# Patient Record
Sex: FEMALE | Race: WHITE
[De-identification: names, ages, dates, MRNs, and addresses within clinical notes are randomized per-mention and may not be internally consistent; named-entity substitution may affect disease eponyms.]

---

## 2017-06-01 ENCOUNTER — HOSPITAL ENCOUNTER (EMERGENCY)
Dept: HOSPITAL 79 - ER1 | Age: 52
Discharge: HOME | End: 2017-06-01
Payer: COMMERCIAL

## 2017-06-01 DIAGNOSIS — F17.210: ICD-10-CM

## 2017-06-01 DIAGNOSIS — J44.1: Primary | ICD-10-CM

## 2017-06-01 DIAGNOSIS — Z88.0: ICD-10-CM

## 2017-06-01 DIAGNOSIS — Z91.041: ICD-10-CM

## 2017-06-01 LAB
BUN/CREATININE RATIO: 30 (ref 0–10)
HGB BLD-MCNC: 14.2 GM/DL (ref 12.3–15.3)
RED BLOOD COUNT: 4.41 M/UL (ref 4–5.1)
WHITE BLOOD COUNT: 8.1 K/UL (ref 4.5–11)

## 2017-10-03 ENCOUNTER — OFFICE VISIT (OUTPATIENT)
Dept: FAMILY MEDICINE CLINIC | Facility: CLINIC | Age: 52
End: 2017-10-03

## 2017-10-03 VITALS
DIASTOLIC BLOOD PRESSURE: 80 MMHG | BODY MASS INDEX: 44.65 KG/M2 | TEMPERATURE: 98.8 F | HEART RATE: 107 BPM | WEIGHT: 268 LBS | SYSTOLIC BLOOD PRESSURE: 140 MMHG | HEIGHT: 65 IN | OXYGEN SATURATION: 93 %

## 2017-10-03 DIAGNOSIS — E11.9 CONTROLLED TYPE 2 DIABETES MELLITUS WITHOUT COMPLICATION, WITHOUT LONG-TERM CURRENT USE OF INSULIN (HCC): ICD-10-CM

## 2017-10-03 DIAGNOSIS — L02.32 BOIL OF BUTTOCK: ICD-10-CM

## 2017-10-03 DIAGNOSIS — M15.9 PRIMARY OSTEOARTHRITIS INVOLVING MULTIPLE JOINTS: ICD-10-CM

## 2017-10-03 DIAGNOSIS — I10 ESSENTIAL HYPERTENSION: Primary | ICD-10-CM

## 2017-10-03 DIAGNOSIS — E78.2 MIXED HYPERLIPIDEMIA: ICD-10-CM

## 2017-10-03 DIAGNOSIS — E03.9 ACQUIRED HYPOTHYROIDISM: ICD-10-CM

## 2017-10-03 DIAGNOSIS — E53.8 VITAMIN B 12 DEFICIENCY: ICD-10-CM

## 2017-10-03 DIAGNOSIS — E55.9 VITAMIN D DEFICIENCY DISEASE: ICD-10-CM

## 2017-10-03 DIAGNOSIS — J06.9 ACUTE URI: ICD-10-CM

## 2017-10-03 DIAGNOSIS — Z72.0 TOBACCO ABUSE: ICD-10-CM

## 2017-10-03 PROBLEM — M15.0 PRIMARY OSTEOARTHRITIS INVOLVING MULTIPLE JOINTS: Status: ACTIVE | Noted: 2017-10-03

## 2017-10-03 PROCEDURE — 99406 BEHAV CHNG SMOKING 3-10 MIN: CPT | Performed by: NURSE PRACTITIONER

## 2017-10-03 PROCEDURE — 99214 OFFICE O/P EST MOD 30 MIN: CPT | Performed by: NURSE PRACTITIONER

## 2017-10-03 RX ORDER — IBUPROFEN 800 MG/1
TABLET ORAL
COMMUNITY
Start: 2017-10-02 | End: 2017-11-07

## 2017-10-03 RX ORDER — CYCLOBENZAPRINE HCL 10 MG
10 TABLET ORAL 2 TIMES DAILY PRN
Qty: 60 TABLET | Refills: 3 | Status: SHIPPED | OUTPATIENT
Start: 2017-10-03 | End: 2018-03-06 | Stop reason: SDUPTHER

## 2017-10-03 RX ORDER — HYDROCHLOROTHIAZIDE 25 MG/1
TABLET ORAL
Refills: 0 | COMMUNITY
Start: 2017-09-14 | End: 2017-11-07

## 2017-10-03 RX ORDER — AZITHROMYCIN 250 MG/1
TABLET, FILM COATED ORAL
Qty: 6 TABLET | Refills: 0 | Status: SHIPPED | OUTPATIENT
Start: 2017-10-03 | End: 2017-11-07

## 2017-10-03 RX ORDER — LEVOTHYROXINE SODIUM 88 UG/1
88 TABLET ORAL DAILY
COMMUNITY
End: 2017-10-04 | Stop reason: DRUGHIGH

## 2017-10-03 RX ORDER — CYCLOBENZAPRINE HCL 10 MG
TABLET ORAL
COMMUNITY
Start: 2017-10-02 | End: 2017-10-03 | Stop reason: SDUPTHER

## 2017-10-03 RX ORDER — PROMETHAZINE HYDROCHLORIDE 25 MG/1
TABLET ORAL
Refills: 0 | COMMUNITY
Start: 2017-08-22 | End: 2017-11-07

## 2017-10-03 RX ORDER — LISINOPRIL 10 MG/1
TABLET ORAL
Refills: 2 | COMMUNITY
Start: 2017-08-31 | End: 2017-10-03 | Stop reason: SINTOL

## 2017-10-03 NOTE — ASSESSMENT & PLAN NOTE
Tobacco use counseling was provided for a total of 6 minutes today.  Patient has failed Chantix.  She declines smoke cessation at this time.  We have reviewed the side effects of smoking on her disease processes.

## 2017-10-03 NOTE — PROGRESS NOTES
Subjective   Corinne CEDENO is a 52 y.o. female.     Chief Complaint   Patient presents with   • Hyperlipidemia   • Hypothyroidism   • Osteoarthritis   • Hypertension   • Diabetes       History of Present Illness     Patient is here today to establish care with Mimi HALL.  Patient has been under the care of [facility].  She reports that she has transferred care because she was unsure why she had to be seen every 14 days for refills on ibuprofen and Flexeril.    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  She has adult children.     Hypothyroidism-currently taking Synthroid 88 µg daily.  Patient reports that her most recent lab values showed that her thyroid function was not controlled.  She does remain fatigued and has difficulty losing weight.    Hypertension-patient is currently taking lisinopril 10 mg daily along with HCTZ 25 mg as needed.  She reports that the lisinopril has created a dry nagging cough.  This started a few weeks after she started lisinopril and has lasted now for several months.  Patient reports that she shared this with her primary care provider but medication was not changed.  She does check her blood pressure occasionally when in [facility] or other public places with blood pressure monitor.     Hyperlipidemia-patient reports that her most recent lab values showed a very high triglyceride level.  She states that she tried to tell her primary care provider that she had ate half a honey bun and drank some soda just prior to having these labs drawn which may have affected the value.  She was told by her provider that it didn't matter and started on cholesterol medication which she is not taking.  Patient is scheduled to see cardiology due to uncontrolled hyperlipidemia.  Has appointment with cardiology next week scheduled with Dr. Segovia October 10 , 2017.       Osteoarthritis-bilateral kneeosteoarthritis.  She has had rooster, injections in  the past.  She remains under the care of orthopedic specialty group for osteoarthritis.  In the past she did take pain medication.  She is now taking ibuprofen 800 mg every 8 hours as needed.  She states that this really does not help her pain much.  She has felt Layne in the past.  Recent weight gain is placing increased stress on her knees.    Diabetes  Current symptoms include hyperglycemia at random times depending on diet.. Patient denies paresthesia of the feet, visual disturbances, polydipsia, polyuria, hypoglycemia and foot ulcerations. Evaluation to date has been: fasting blood sugar and hemoglobin A1C. Home sugars: patient does not check sugars. Current treatments: metformin. Last dilated eye exam with in past two years. Most recent hemoglobin A1c   Lab Results   Component Value Date    HGBA1C 6.0 (H) 11/13/2014      Boil on buttock -patient reports having a history of recurrent boils in her groin, breast and buttock area.  She does not wish to have this examined today.    Smoker-patient smokes approximately 1/2-1 pack of cigarettes a day.  She states she has taken Chantix in the past.  She has not tried patches.  She does not wish to quit smoking at this time.    Vitamin B12 deficiency-patient was previously on vitamin B12 injections which did help with energy.    Vitamin D deficiency-patient is not currently taking a supplement.  She does not receive at least 30 minutes of sun exposure daily.    Low back pain with muscle spasm-patient reports that Flexeril 10 mg 1 by mouth twice a day when necessary is helpful to control low back pain and spasm.  She denies any substance abuse or addiction.  She denies any side effects.    Acute URI symptoms - symptoms have been present for over a week.  Thick postnasal drainage.  Sinus tenderness and ear fullness.      The following portions of the patient's history were reviewed and updated as appropriate: allergies, current medications, past family history, past  "medical history, past social history, past surgical history and problem list.    Review of Systems   Constitutional: Positive for fatigue and unexpected weight change. Negative for activity change and appetite change.   HENT: Positive for congestion, ear pain and sinus pressure.    Eyes: Negative.    Respiratory: Positive for cough. Negative for shortness of breath and wheezing.    Cardiovascular: Positive for leg swelling (At times her legs do swell when she eats in decreased sodium.  She takes HCTZ as needed for edema. Needs less often than daily). Negative for chest pain and palpitations.   Gastrointestinal: Positive for constipation and nausea. Negative for abdominal pain, blood in stool, diarrhea and vomiting.        Frequent constipation.  Nausea at times   Endocrine: Negative.    Genitourinary: Negative for dysuria.        History of vaginal mesh   Musculoskeletal: Positive for arthralgias, back pain, gait problem, joint swelling and myalgias. Negative for neck stiffness.   Skin: Positive for rash.   Allergic/Immunologic: Positive for environmental allergies.   Hematological: Negative.    Psychiatric/Behavioral: Negative for agitation, behavioral problems, dysphoric mood, self-injury and suicidal ideas. The patient is not nervous/anxious.    All other systems reviewed and are negative.      Objective     /80 (BP Location: Right arm, Patient Position: Sitting, Cuff Size: Adult)  Pulse 107  Temp 98.8 °F (37.1 °C) (Tympanic)   Ht 65\" (165.1 cm)  Wt 268 lb (122 kg)  SpO2 93%  BMI 44.6 kg/m2      Physical Exam   Constitutional: She is oriented to person, place, and time. Vital signs are normal. She appears well-developed and well-nourished. She has a sickly appearance. No distress.   Appears acutely ill.    HENT:   Head: Normocephalic and atraumatic.   Right Ear: Hearing and external ear normal. No lacerations. No drainage or swelling. No foreign bodies. No mastoid tenderness. A middle ear effusion is " present.   Left Ear: Hearing and external ear normal. No lacerations. No drainage or swelling. No foreign bodies. No mastoid tenderness. A middle ear effusion is present.   Nose: No nose lacerations, sinus tenderness or nasal deformity. No epistaxis.  No foreign bodies. Right sinus exhibits frontal sinus tenderness. Left sinus exhibits frontal sinus tenderness.   Mouth/Throat: Uvula is midline. Oropharyngeal exudate present. No tonsillar abscesses.   TM's are cloudy bilateral   Eyes: EOM are normal. Pupils are equal, round, and reactive to light.   Neck: Normal range of motion. Neck supple. No tracheal deviation present. No thyromegaly present.   Cardiovascular: Normal rate, regular rhythm, normal heart sounds and intact distal pulses.    No murmur heard.  Pulmonary/Chest: Effort normal and breath sounds normal. No respiratory distress. She has no wheezes. She has no rales. She exhibits no tenderness.   Cough noted    Abdominal: Soft. Bowel sounds are normal. She exhibits no distension and no mass. There is no tenderness. There is no rebound and no guarding.   Musculoskeletal:        Right knee: Tenderness found. Patellar tendon tenderness noted.        Left knee: Tenderness found. Patellar tendon tenderness noted.        Lumbar back: She exhibits decreased range of motion, tenderness, pain and spasm.   Decreased lumbar curvature, there is pain with forward flexion. DTR's +2. No edema.    Lymphadenopathy:     She has cervical adenopathy.        Right cervical: Superficial cervical adenopathy present. No deep cervical adenopathy present.       Left cervical: Superficial cervical adenopathy present. No deep cervical adenopathy present.   Neurological: She is alert and oriented to person, place, and time. She has normal reflexes.   Skin: Skin is warm and dry. Lesion noted. She is not diaphoretic. No erythema. No pallor.   Declines examination of buttock boil    Psychiatric: She has a normal mood and affect. Her  behavior is normal. Judgment and thought content normal. Her affect is not inappropriate. Cognition and memory are normal.   Denies thoughts of hurting self or others.   Nursing note and vitals reviewed.      Assessment/Plan     Problem List Items Addressed This Visit        Cardiovascular and Mediastinum    Essential hypertension - Primary    Relevant Medications    hydrochlorothiazide (HYDRODIURIL) 25 MG tablet    metoprolol tartrate (LOPRESSOR) 25 MG tablet    Other Relevant Orders    CBC & Differential    Comprehensive Metabolic Panel    MicroAlbumin, Urine, Random - Urine, Clean Catch    Mixed hyperlipidemia    Relevant Orders    Lipid Panel       Respiratory    Acute URI    Relevant Medications    azithromycin (ZITHROMAX Z-TERRANCE) 250 MG tablet       Digestive    Vitamin D deficiency disease    Relevant Orders    Vitamin D 25 Hydroxy    Vitamin B 12 deficiency    Relevant Orders    Vitamin B12       Endocrine    Controlled type 2 diabetes mellitus without complication    Relevant Orders    Hemoglobin A1c    Acquired hypothyroidism    Relevant Medications    levothyroxine (SYNTHROID, LEVOTHROID) 88 MCG tablet    metoprolol tartrate (LOPRESSOR) 25 MG tablet    Other Relevant Orders    TSH    T3, Free    T4, Free    Thyroid Antibodies       Musculoskeletal and Integument    Primary osteoarthritis involving multiple joints    Overview     Both knees, worse in left knee , history of rooster comb injections          Boil of buttock    Relevant Medications    azithromycin (ZITHROMAX Z-TERRANCE) 250 MG tablet    mupirocin (BACTROBAN) 2 % ointment       Other    Tobacco abuse    Current Assessment & Plan     Tobacco use counseling was provided for a total of 6 minutes today.  Patient has failed Chantix.  She declines smoke cessation at this time.  We have reviewed the side effects of smoking on her disease processes.           Recommend patient perform home glucose monitoring twice daily and bring log to next visit.  Pt has  been educated/instructed on diabetic care and protocols. Sick day rules reviewed. Continue to monitor blood sugar and report abnormal readings as discussed today. Pt / family state understanding.   Weight loss and exercise as tolerated is recommended.  I have discussed diagnosis in detail today allowing time for questions and answers. Pt is aware of reasons to seek urgent or emergent medical care as well as reasons to return to the clinic for evaluation. Possible side effects, interactions and progression of symptoms discussed as well. Pt / family states understanding.   Stop lisinopril.  Start metoprolol 25 mg by mouth twice a day.  Reviewed need to monitor her heart rate and hold it metoprolol if less than 60.  Patient states understanding.  Schedule fasting labs in the morning.  Request old records.  Recommend flu vaccine when available.  Follow-up in 2-4 weeks, sooner if needed.           This document has been electronically signed by:  RAFAEL Mazariegos, NP-C

## 2017-10-04 ENCOUNTER — LAB (OUTPATIENT)
Dept: FAMILY MEDICINE CLINIC | Facility: CLINIC | Age: 52
End: 2017-10-04

## 2017-10-04 DIAGNOSIS — E78.2 MIXED HYPERLIPIDEMIA: ICD-10-CM

## 2017-10-04 DIAGNOSIS — E53.8 VITAMIN B 12 DEFICIENCY: ICD-10-CM

## 2017-10-04 DIAGNOSIS — E03.9 ACQUIRED HYPOTHYROIDISM: ICD-10-CM

## 2017-10-04 DIAGNOSIS — I10 ESSENTIAL HYPERTENSION: ICD-10-CM

## 2017-10-04 DIAGNOSIS — E11.9 CONTROLLED TYPE 2 DIABETES MELLITUS WITHOUT COMPLICATION, WITHOUT LONG-TERM CURRENT USE OF INSULIN (HCC): ICD-10-CM

## 2017-10-04 DIAGNOSIS — E03.9 ACQUIRED HYPOTHYROIDISM: Primary | ICD-10-CM

## 2017-10-04 DIAGNOSIS — E55.9 VITAMIN D DEFICIENCY DISEASE: ICD-10-CM

## 2017-10-04 LAB
25(OH)D3 SERPL-MCNC: 17 NG/ML
ALBUMIN SERPL-MCNC: 4.2 G/DL (ref 3.5–5)
ALBUMIN/GLOB SERPL: 1.4 G/DL (ref 1.5–2.5)
ALP SERPL-CCNC: 73 U/L (ref 35–104)
ALT SERPL W P-5'-P-CCNC: 19 U/L (ref 10–36)
ANION GAP SERPL CALCULATED.3IONS-SCNC: 5 MMOL/L (ref 3.6–11.2)
AST SERPL-CCNC: 18 U/L (ref 10–30)
BASOPHILS # BLD AUTO: 0.06 10*3/MM3 (ref 0–0.3)
BASOPHILS NFR BLD AUTO: 0.7 % (ref 0–2)
BILIRUB SERPL-MCNC: 0.3 MG/DL (ref 0.2–1.8)
BUN BLD-MCNC: 16 MG/DL (ref 7–21)
BUN/CREAT SERPL: 26.2 (ref 7–25)
CALCIUM SPEC-SCNC: 9.2 MG/DL (ref 7.7–10)
CHLORIDE SERPL-SCNC: 110 MMOL/L (ref 99–112)
CHOLEST SERPL-MCNC: 285 MG/DL (ref 0–200)
CO2 SERPL-SCNC: 25 MMOL/L (ref 24.3–31.9)
CREAT BLD-MCNC: 0.61 MG/DL (ref 0.43–1.29)
DEPRECATED RDW RBC AUTO: 46.1 FL (ref 37–54)
EOSINOPHIL # BLD AUTO: 0.41 10*3/MM3 (ref 0–0.7)
EOSINOPHIL NFR BLD AUTO: 4.8 % (ref 0–5)
ERYTHROCYTE [DISTWIDTH] IN BLOOD BY AUTOMATED COUNT: 13.2 % (ref 11.5–14.5)
GFR SERPL CREATININE-BSD FRML MDRD: 104 ML/MIN/1.73
GLOBULIN UR ELPH-MCNC: 2.9 GM/DL
GLUCOSE BLD-MCNC: 114 MG/DL (ref 70–110)
HBA1C MFR BLD: 6.2 % (ref 4.5–5.7)
HCT VFR BLD AUTO: 42.6 % (ref 37–47)
HDLC SERPL-MCNC: 50 MG/DL (ref 60–100)
HGB BLD-MCNC: 13.8 G/DL (ref 12–16)
IMM GRANULOCYTES # BLD: 0.02 10*3/MM3 (ref 0–0.03)
IMM GRANULOCYTES NFR BLD: 0.2 % (ref 0–0.5)
LDLC SERPL CALC-MCNC: 163 MG/DL (ref 0–100)
LDLC/HDLC SERPL: 3.26 {RATIO}
LYMPHOCYTES # BLD AUTO: 3.07 10*3/MM3 (ref 1–3)
LYMPHOCYTES NFR BLD AUTO: 35.6 % (ref 21–51)
MCH RBC QN AUTO: 31.7 PG (ref 27–33)
MCHC RBC AUTO-ENTMCNC: 32.4 G/DL (ref 33–37)
MCV RBC AUTO: 97.9 FL (ref 80–94)
MONOCYTES # BLD AUTO: 0.75 10*3/MM3 (ref 0.1–0.9)
MONOCYTES NFR BLD AUTO: 8.7 % (ref 0–10)
NEUTROPHILS # BLD AUTO: 4.32 10*3/MM3 (ref 1.4–6.5)
NEUTROPHILS NFR BLD AUTO: 50 % (ref 30–70)
OSMOLALITY SERPL CALC.SUM OF ELEC: 281.4 MOSM/KG (ref 273–305)
PLATELET # BLD AUTO: 335 10*3/MM3 (ref 130–400)
PMV BLD AUTO: 11.6 FL (ref 6–10)
POTASSIUM BLD-SCNC: 4.3 MMOL/L (ref 3.5–5.3)
PROT SERPL-MCNC: 7.1 G/DL (ref 6–8)
RBC # BLD AUTO: 4.35 10*6/MM3 (ref 4.2–5.4)
SODIUM BLD-SCNC: 140 MMOL/L (ref 135–153)
T3FREE SERPL-MCNC: 2.4 PG/ML (ref 2.3–4.2)
T4 FREE SERPL-MCNC: 0.91 NG/DL (ref 0.89–1.76)
TRIGL SERPL-MCNC: 359 MG/DL (ref 0–150)
TSH SERPL DL<=0.05 MIU/L-ACNC: 17.12 MIU/ML (ref 0.55–4.78)
VIT B12 BLD-MCNC: 361 PG/ML (ref 211–911)
VLDLC SERPL-MCNC: 71.8 MG/DL
WBC NRBC COR # BLD: 8.63 10*3/MM3 (ref 4.5–12.5)

## 2017-10-04 PROCEDURE — 86800 THYROGLOBULIN ANTIBODY: CPT | Performed by: NURSE PRACTITIONER

## 2017-10-04 PROCEDURE — 84439 ASSAY OF FREE THYROXINE: CPT | Performed by: NURSE PRACTITIONER

## 2017-10-04 PROCEDURE — 82607 VITAMIN B-12: CPT | Performed by: NURSE PRACTITIONER

## 2017-10-04 PROCEDURE — 82306 VITAMIN D 25 HYDROXY: CPT | Performed by: NURSE PRACTITIONER

## 2017-10-04 PROCEDURE — 84481 FREE ASSAY (FT-3): CPT | Performed by: NURSE PRACTITIONER

## 2017-10-04 PROCEDURE — 86376 MICROSOMAL ANTIBODY EACH: CPT | Performed by: NURSE PRACTITIONER

## 2017-10-04 PROCEDURE — 80061 LIPID PANEL: CPT | Performed by: NURSE PRACTITIONER

## 2017-10-04 PROCEDURE — 80050 GENERAL HEALTH PANEL: CPT | Performed by: NURSE PRACTITIONER

## 2017-10-04 PROCEDURE — 83036 HEMOGLOBIN GLYCOSYLATED A1C: CPT | Performed by: NURSE PRACTITIONER

## 2017-10-04 RX ORDER — LEVOTHYROXINE SODIUM 112 UG/1
112 TABLET ORAL DAILY
Qty: 30 TABLET | Refills: 3 | Status: SHIPPED | OUTPATIENT
Start: 2017-10-04 | End: 2018-03-06 | Stop reason: SDUPTHER

## 2017-10-04 NOTE — PROGRESS NOTES
Called pt and let them know to  synthroid increased dosage at the pharmacy. Advised pt to stop taking the 88mcg. PKF

## 2017-10-05 LAB
THYROGLOB AB SERPL-ACNC: 29 IU/ML (ref 0–0.9)
THYROPEROXIDASE AB SERPL-ACNC: 178 IU/ML (ref 0–34)

## 2017-11-07 ENCOUNTER — OFFICE VISIT (OUTPATIENT)
Dept: FAMILY MEDICINE CLINIC | Facility: CLINIC | Age: 52
End: 2017-11-07

## 2017-11-07 VITALS
HEIGHT: 65 IN | SYSTOLIC BLOOD PRESSURE: 132 MMHG | OXYGEN SATURATION: 93 % | WEIGHT: 264 LBS | DIASTOLIC BLOOD PRESSURE: 80 MMHG | HEART RATE: 74 BPM | TEMPERATURE: 98.1 F | BODY MASS INDEX: 43.99 KG/M2

## 2017-11-07 DIAGNOSIS — Z72.0 TOBACCO ABUSE: ICD-10-CM

## 2017-11-07 DIAGNOSIS — E78.2 MIXED HYPERLIPIDEMIA: ICD-10-CM

## 2017-11-07 DIAGNOSIS — Z12.39 SCREENING FOR BREAST CANCER: ICD-10-CM

## 2017-11-07 DIAGNOSIS — Z23 ENCOUNTER FOR IMMUNIZATION: ICD-10-CM

## 2017-11-07 DIAGNOSIS — E53.8 VITAMIN B 12 DEFICIENCY: ICD-10-CM

## 2017-11-07 DIAGNOSIS — E03.9 ACQUIRED HYPOTHYROIDISM: ICD-10-CM

## 2017-11-07 DIAGNOSIS — I10 ESSENTIAL HYPERTENSION: ICD-10-CM

## 2017-11-07 DIAGNOSIS — E11.51 TYPE 2 DIABETES MELLITUS WITH DIABETIC PERIPHERAL ANGIOPATHY WITHOUT GANGRENE, WITHOUT LONG-TERM CURRENT USE OF INSULIN (HCC): Primary | ICD-10-CM

## 2017-11-07 DIAGNOSIS — J30.89 NON-SEASONAL ALLERGIC RHINITIS DUE TO OTHER ALLERGIC TRIGGER, UNSPECIFIED CHRONICITY: ICD-10-CM

## 2017-11-07 DIAGNOSIS — M15.9 PRIMARY OSTEOARTHRITIS INVOLVING MULTIPLE JOINTS: ICD-10-CM

## 2017-11-07 DIAGNOSIS — E55.9 VITAMIN D DEFICIENCY DISEASE: ICD-10-CM

## 2017-11-07 PROBLEM — E11.59 TYPE 2 DIABETES MELLITUS WITH CIRCULATORY DISORDER (HCC): Status: ACTIVE | Noted: 2017-10-03

## 2017-11-07 PROCEDURE — 99215 OFFICE O/P EST HI 40 MIN: CPT | Performed by: NURSE PRACTITIONER

## 2017-11-07 PROCEDURE — 90686 IIV4 VACC NO PRSV 0.5 ML IM: CPT | Performed by: NURSE PRACTITIONER

## 2017-11-07 PROCEDURE — 90471 IMMUNIZATION ADMIN: CPT | Performed by: NURSE PRACTITIONER

## 2017-11-07 RX ORDER — EZETIMIBE 10 MG/1
10 TABLET ORAL DAILY
Qty: 30 TABLET | Refills: 5 | Status: SHIPPED | OUTPATIENT
Start: 2017-11-07 | End: 2018-03-06 | Stop reason: SDUPTHER

## 2017-11-07 RX ORDER — CETIRIZINE HYDROCHLORIDE 10 MG/1
10 TABLET ORAL DAILY PRN
Qty: 30 TABLET | Refills: 5 | Status: SHIPPED | OUTPATIENT
Start: 2017-11-07 | End: 2018-04-02

## 2017-11-07 RX ORDER — FENOFIBRATE 67 MG/1
67 CAPSULE ORAL
Qty: 30 CAPSULE | Refills: 5 | Status: SHIPPED | OUTPATIENT
Start: 2017-11-07 | End: 2018-03-06 | Stop reason: SDUPTHER

## 2017-11-07 RX ORDER — CHOLECALCIFEROL (VITAMIN D3) 1250 MCG
50000 CAPSULE ORAL
Qty: 4 CAPSULE | Refills: 5 | Status: SHIPPED | OUTPATIENT
Start: 2017-11-07 | End: 2018-03-06 | Stop reason: SDUPTHER

## 2017-11-07 RX ORDER — LANCETS 30 GAUGE
1 EACH MISCELLANEOUS DAILY
Qty: 30 EACH | Refills: 5 | Status: SHIPPED | OUTPATIENT
Start: 2017-11-07 | End: 2018-03-06 | Stop reason: SDUPTHER

## 2017-11-07 RX ORDER — FLUTICASONE PROPIONATE 50 MCG
2 SPRAY, SUSPENSION (ML) NASAL DAILY
Qty: 1 BOTTLE | Refills: 5 | Status: SHIPPED | OUTPATIENT
Start: 2017-11-07 | End: 2018-03-06 | Stop reason: SDUPTHER

## 2017-11-07 NOTE — PROGRESS NOTES
Subjective   Corinne CEDENO is a 52 y.o. female.     Chief Complaint   Patient presents with   • Results   • Hypothyroidism       History of Present Illness     Patient is here to follow-up on multiple chronic processes.  She has lab results to review and discuss.      New diagnosis of type 2 diabetes-patient is unaware that she is a type II diabetic.  In the past she did take metformin for prediabetes but was unable to tolerate due to GI side effects.    Essential hypertension-stable on Lopressor.    Mixed hyperlipidemia-patient was unable to tolerate statins due to muscle pain and weakness.  She admits to a high fat unhealthy diet.  She is unable to exercise due to chronic knee pain.    Vitamin D deficiency-patient was unaware she had vitamin D deficiency.  She is not taking a supplement.  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    Vitamin B12 deficiency-patient is taking monthly B12 injection.  She reports having some increased energy with recent B12 injection.    Hypothyroidism-patient has adjusted her Synthroid as instructed after review of recent labs.  She has not been seen by endocrinology.  She has not had a thyroid ultrasound.    Osteoarthritis involving multiple joints-chronic with symptoms present greater than 5 years.  Patient has had a joint injection in her knees as well as rooster, injections in the past.  Rooster comb  injections are helpful for 3-6 months.  Patient is scheduled this month to repeat Rister come injections.    Tobacco abuse-patient smokes one pack or more a day.  She states that at this time she is not interested in smoke cessation medication.  She'll stop in the past with Chantix.  The following portions of the patient's history were reviewed and updated as appropriate: allergies, current medications, past family history, past medical history, past social history, past surgical history and problem list.    Review of Systems   Constitutional: Positive for fatigue.  "Negative for activity change, appetite change, chills and fever.   HENT: Positive for congestion and rhinorrhea. Negative for ear pain, facial swelling, hearing loss, sinus pressure, sore throat, trouble swallowing and voice change.    Eyes: Negative for pain, discharge and visual disturbance.   Respiratory: Negative.  Negative for apnea, cough, chest tightness, shortness of breath and wheezing.    Cardiovascular: Negative.  Negative for chest pain, palpitations and leg swelling.   Gastrointestinal: Negative.  Negative for abdominal pain, blood in stool, constipation, diarrhea, nausea and vomiting.   Endocrine: Negative.    Genitourinary: Negative for dysuria.   Musculoskeletal: Positive for arthralgias and gait problem. Negative for neck stiffness.   Skin: Negative for color change.   Allergic/Immunologic: Positive for environmental allergies.   Neurological: Negative for headaches.   Hematological: Negative.    Psychiatric/Behavioral: Negative for confusion, dysphoric mood and suicidal ideas. The patient is not nervous/anxious.    All other systems reviewed and are negative.      Objective     /80 (BP Location: Right arm, Patient Position: Sitting, Cuff Size: Adult)  Pulse 74  Temp 98.1 °F (36.7 °C) (Tympanic)   Ht 65\" (165.1 cm)  Wt 264 lb (120 kg)  SpO2 93%  BMI 43.93 kg/m2  Lab on 10/04/2017   Component Date Value Ref Range Status   • Glucose 10/04/2017 114* 70 - 110 mg/dL Final   • BUN 10/04/2017 16  7 - 21 mg/dL Final   • Creatinine 10/04/2017 0.61  0.43 - 1.29 mg/dL Final   • Sodium 10/04/2017 140  135 - 153 mmol/L Final   • Potassium 10/04/2017 4.3  3.5 - 5.3 mmol/L Final   • Chloride 10/04/2017 110  99 - 112 mmol/L Final   • CO2 10/04/2017 25.0  24.3 - 31.9 mmol/L Final   • Calcium 10/04/2017 9.2  7.7 - 10.0 mg/dL Final   • Total Protein 10/04/2017 7.1  6.0 - 8.0 g/dL Final   • Albumin 10/04/2017 4.20  3.50 - 5.00 g/dL Final   • ALT (SGPT) 10/04/2017 19  10 - 36 U/L Final   • AST (SGOT) " 10/04/2017 18  10 - 30 U/L Final   • Alkaline Phosphatase 10/04/2017 73  35 - 104 U/L Final   • Total Bilirubin 10/04/2017 0.3  0.2 - 1.8 mg/dL Final   • eGFR Non African Amer 10/04/2017 104  >60 mL/min/1.73 Final   • Globulin 10/04/2017 2.9  gm/dL Final   • A/G Ratio 10/04/2017 1.4* 1.5 - 2.5 g/dL Final   • BUN/Creatinine Ratio 10/04/2017 26.2* 7.0 - 25.0 Final   • Anion Gap 10/04/2017 5.0  3.6 - 11.2 mmol/L Final   • TSH 10/04/2017 17.123* 0.550 - 4.780 mIU/mL Final   • Hemoglobin A1C 10/04/2017 6.20* 4.50 - 5.70 % Final   • 25 Hydroxy, Vitamin D 10/04/2017 17.0  ng/ml Final   • Total Cholesterol 10/04/2017 285* 0 - 200 mg/dL Final   • Triglycerides 10/04/2017 359* 0 - 150 mg/dL Final   • HDL Cholesterol 10/04/2017 50* 60 - 100 mg/dL Final   • LDL Cholesterol  10/04/2017 163* 0 - 100 mg/dL Final   • VLDL Cholesterol 10/04/2017 71.8  mg/dL Final   • LDL/HDL Ratio 10/04/2017 3.26   Final   • Vitamin B-12 10/04/2017 361  211 - 911 pg/mL Final   • Free T4 10/04/2017 0.91  0.89 - 1.76 ng/dL Final   • Thyroid Peroxidase Antibody 10/04/2017 178* 0 - 34 IU/mL Final   • Thyroglobulin Ab 10/04/2017 29.0* 0.0 - 0.9 IU/mL Final   • WBC 10/04/2017 8.63  4.50 - 12.50 10*3/mm3 Final   • RBC 10/04/2017 4.35  4.20 - 5.40 10*6/mm3 Final   • Hemoglobin 10/04/2017 13.8  12.0 - 16.0 g/dL Final   • Hematocrit 10/04/2017 42.6  37.0 - 47.0 % Final   • MCV 10/04/2017 97.9* 80.0 - 94.0 fL Final   • MCH 10/04/2017 31.7  27.0 - 33.0 pg Final   • MCHC 10/04/2017 32.4* 33.0 - 37.0 g/dL Final   • RDW 10/04/2017 13.2  11.5 - 14.5 % Final   • RDW-SD 10/04/2017 46.1  37.0 - 54.0 fl Final   • MPV 10/04/2017 11.6* 6.0 - 10.0 fL Final   • Platelets 10/04/2017 335  130 - 400 10*3/mm3 Final   • Neutrophil % 10/04/2017 50.0  30.0 - 70.0 % Final   • Lymphocyte % 10/04/2017 35.6  21.0 - 51.0 % Final   • Monocyte % 10/04/2017 8.7  0.0 - 10.0 % Final   • Eosinophil % 10/04/2017 4.8  0.0 - 5.0 % Final   • Basophil % 10/04/2017 0.7  0.0 - 2.0 % Final   •  Immature Grans % 10/04/2017 0.2  0.0 - 0.5 % Final   • Neutrophils, Absolute 10/04/2017 4.32  1.40 - 6.50 10*3/mm3 Final   • Lymphocytes, Absolute 10/04/2017 3.07* 1.00 - 3.00 10*3/mm3 Final   • Monocytes, Absolute 10/04/2017 0.75  0.10 - 0.90 10*3/mm3 Final   • Eosinophils, Absolute 10/04/2017 0.41  0.00 - 0.70 10*3/mm3 Final   • Basophils, Absolute 10/04/2017 0.06  0.00 - 0.30 10*3/mm3 Final   • Immature Grans, Absolute 10/04/2017 0.02  0.00 - 0.03 10*3/mm3 Final   • Osmolality Calc 10/04/2017 281.4  273.0 - 305.0 mOsm/kg Final       Physical Exam   Constitutional: She is oriented to person, place, and time. Vital signs are normal. She appears well-developed and well-nourished. No distress.   Visibly obese.   Very pleasant adult female.   HENT:   Head: Normocephalic and atraumatic.   Right Ear: External ear normal.   Left Ear: External ear normal.   Nose: Mucosal edema present.   Mouth/Throat: Oropharynx is clear and moist.   Eyes: EOM are normal. Pupils are equal, round, and reactive to light.   Neck: Normal range of motion. Neck supple. No tracheal deviation present. No thyromegaly present.   Cardiovascular: Normal rate, regular rhythm, normal heart sounds and intact distal pulses.    No murmur heard.  Pulmonary/Chest: Effort normal and breath sounds normal. No respiratory distress. She has no wheezes. She has no rales. She exhibits no tenderness.   Abdominal: Soft. Bowel sounds are normal. She exhibits no distension and no mass. There is no tenderness. There is no rebound and no guarding.   Musculoskeletal:        Right knee: She exhibits decreased range of motion and effusion.        Left knee: She exhibits decreased range of motion and effusion.        Lumbar back: She exhibits decreased range of motion, tenderness, pain and spasm.   Decreased lumbar curvature, there is pain with forward flexion. DTR's +2. No edema.    Lymphadenopathy:     She has no cervical adenopathy.   Neurological: She is alert and  oriented to person, place, and time. She has normal reflexes.   Skin: Skin is warm and dry. No rash noted. She is not diaphoretic. No erythema. No pallor.   Psychiatric: She has a normal mood and affect. Her speech is normal and behavior is normal. Judgment and thought content normal. Her affect is not inappropriate. Cognition and memory are normal.   Denies thoughts of hurting self or others.   Nursing note and vitals reviewed.      Assessment/Plan     Problem List Items Addressed This Visit        Cardiovascular and Mediastinum    Essential hypertension    Type 2 diabetes mellitus with circulatory disorder - Primary    Relevant Medications    Blood Glucose Monitoring Suppl kit    DROPLET LANCETS ULTRA THIN 30G misc    glucose blood test strip    Other Relevant Orders    US Thyroid    CBC & Differential    Comprehensive Metabolic Panel    Lipid Panel    TSH    Hemoglobin A1c    Ambulatory Referral to Endocrinology (Completed)    Mixed hyperlipidemia    Relevant Medications    ezetimibe (ZETIA) 10 MG tablet    fenofibrate micronized (LOFIBRA) 67 MG capsule       Digestive    Vitamin D deficiency disease    Relevant Medications    Cholecalciferol (VITAMIN D3) 42056 units capsule    Other Relevant Orders    Vitamin D 25 Hydroxy    Vitamin B 12 deficiency       Endocrine    Acquired hypothyroidism    Relevant Orders    TSH    Ambulatory Referral to Endocrinology (Completed)       Musculoskeletal and Integument    Primary osteoarthritis involving multiple joints    Overview     Both knees, worse in left knee , history of rooster comb injections             Other    Tobacco abuse      Other Visit Diagnoses     Screening for breast cancer        Relevant Orders    Mammo Screening Digital Tomosynthesis Bilateral With CAD    Non-seasonal allergic rhinitis due to other allergic trigger, unspecified chronicity        Relevant Medications    cetirizine (zyrTEC) 10 MG tablet    fluticasone (FLONASE) 50 MCG/ACT nasal spray     Encounter for immunization        Relevant Orders    Flu Vaccine Quad PF 3YR+ (FLUARIX/FLUZONE 4886-9937) (Completed)          Recent lab results have been reviewed and discussed in detail with patient.  Refer to endocrinology for consult.  Order for ultrasound of the thyroid.  Schedule screening mammogram.  Start Flonase and Zyrtec.  Avoid known allergens.  Extensive diabetic education has been provided today.  Time face-to-face approximately 45 minutes with 50% of that time spent providing diabetic education with specific details on normal versus abnormal glucose readings, symptoms of hypoglycemia or hyperglycemia, smart food choices as well as risk for comorbid conditions with diabetes.  Patient was provided a prescription for a blood glucose monitor and instructed to check her glucose randomly at least once daily, keep a log and bring to her next appointment.  She understands report any readings greater than 200 immediately.  We will start Zetia 10 mg daily and fenofibrate 67 mg daily.  *Vitamin D weekly will be started.  I have discussed diagnosis in detail today allowing time for questions and answers. Pt is aware of reasons to seek urgent or emergent medical care as well as reasons to return to the clinic for evaluation. Possible side effects, interactions and progression of symptoms discussed as well. Pt / family states understanding.   Pt has been educated/instructed on diabetic care and protocols. Sick day rules reviewed. Continue to monitor blood sugar and report abnormal readings as discussed today. Pt / family state understanding.   Follow-up in approximately 3 months, sooner if needed.  Repeat labs in 3 months.         This document has been electronically signed by:  RAFAEL Mazariegos, NP-C

## 2018-03-06 ENCOUNTER — OFFICE VISIT (OUTPATIENT)
Dept: FAMILY MEDICINE CLINIC | Facility: CLINIC | Age: 53
End: 2018-03-06

## 2018-03-06 VITALS
HEART RATE: 82 BPM | OXYGEN SATURATION: 91 % | WEIGHT: 267 LBS | SYSTOLIC BLOOD PRESSURE: 104 MMHG | BODY MASS INDEX: 44.48 KG/M2 | HEIGHT: 65 IN | TEMPERATURE: 98.4 F | DIASTOLIC BLOOD PRESSURE: 86 MMHG

## 2018-03-06 DIAGNOSIS — I10 ESSENTIAL HYPERTENSION: ICD-10-CM

## 2018-03-06 DIAGNOSIS — F17.210 CIGARETTE SMOKER TWO PACKS A DAY OR LESS: Primary | ICD-10-CM

## 2018-03-06 DIAGNOSIS — E11.51 TYPE 2 DIABETES MELLITUS WITH DIABETIC PERIPHERAL ANGIOPATHY WITHOUT GANGRENE, WITHOUT LONG-TERM CURRENT USE OF INSULIN (HCC): ICD-10-CM

## 2018-03-06 DIAGNOSIS — Z72.0 TOBACCO ABUSE: ICD-10-CM

## 2018-03-06 DIAGNOSIS — M15.9 PRIMARY OSTEOARTHRITIS INVOLVING MULTIPLE JOINTS: ICD-10-CM

## 2018-03-06 DIAGNOSIS — J30.89 NON-SEASONAL ALLERGIC RHINITIS DUE TO OTHER ALLERGIC TRIGGER, UNSPECIFIED CHRONICITY: ICD-10-CM

## 2018-03-06 DIAGNOSIS — E78.2 MIXED HYPERLIPIDEMIA: ICD-10-CM

## 2018-03-06 DIAGNOSIS — E53.8 VITAMIN B 12 DEFICIENCY: ICD-10-CM

## 2018-03-06 DIAGNOSIS — E03.9 ACQUIRED HYPOTHYROIDISM: ICD-10-CM

## 2018-03-06 DIAGNOSIS — E55.9 VITAMIN D DEFICIENCY DISEASE: ICD-10-CM

## 2018-03-06 PROCEDURE — 99214 OFFICE O/P EST MOD 30 MIN: CPT | Performed by: NURSE PRACTITIONER

## 2018-03-06 PROCEDURE — 99407 BEHAV CHNG SMOKING > 10 MIN: CPT | Performed by: NURSE PRACTITIONER

## 2018-03-06 RX ORDER — EZETIMIBE 10 MG/1
10 TABLET ORAL DAILY
Qty: 30 TABLET | Refills: 5 | Status: SHIPPED | OUTPATIENT
Start: 2018-03-06 | End: 2018-03-13

## 2018-03-06 RX ORDER — LANCETS 30 GAUGE
1 EACH MISCELLANEOUS DAILY
Qty: 30 EACH | Refills: 5 | Status: SHIPPED | OUTPATIENT
Start: 2018-03-06

## 2018-03-06 RX ORDER — CHOLECALCIFEROL (VITAMIN D3) 1250 MCG
50000 CAPSULE ORAL
Qty: 4 CAPSULE | Refills: 5 | Status: SHIPPED | OUTPATIENT
Start: 2018-03-06

## 2018-03-06 RX ORDER — CYCLOBENZAPRINE HCL 10 MG
10 TABLET ORAL 2 TIMES DAILY PRN
Qty: 60 TABLET | Refills: 3 | Status: SHIPPED | OUTPATIENT
Start: 2018-03-06

## 2018-03-06 RX ORDER — FENOFIBRATE 67 MG/1
67 CAPSULE ORAL
Qty: 30 CAPSULE | Refills: 5 | Status: SHIPPED | OUTPATIENT
Start: 2018-03-06 | End: 2018-06-24

## 2018-03-06 RX ORDER — FLUTICASONE PROPIONATE 50 MCG
2 SPRAY, SUSPENSION (ML) NASAL DAILY
Qty: 1 BOTTLE | Refills: 5 | Status: SHIPPED | OUTPATIENT
Start: 2018-03-06

## 2018-03-06 RX ORDER — VARENICLINE TARTRATE 0.5 MG/1
TABLET, FILM COATED ORAL
Qty: 11 TABLET | Refills: 0 | Status: SHIPPED | OUTPATIENT
Start: 2018-03-06 | End: 2018-04-02

## 2018-03-06 RX ORDER — CELECOXIB 200 MG/1
200 CAPSULE ORAL DAILY PRN
Qty: 30 CAPSULE | Refills: 5 | Status: SHIPPED | OUTPATIENT
Start: 2018-03-06 | End: 2018-04-02

## 2018-03-06 RX ORDER — VARENICLINE TARTRATE 1 MG/1
1 TABLET, FILM COATED ORAL 2 TIMES DAILY
Qty: 60 TABLET | Refills: 2 | Status: SHIPPED | OUTPATIENT
Start: 2018-03-06 | End: 2018-06-24

## 2018-03-06 RX ORDER — LEVOTHYROXINE SODIUM 112 UG/1
112 TABLET ORAL DAILY
Qty: 30 TABLET | Refills: 3 | Status: SHIPPED | OUTPATIENT
Start: 2018-03-06

## 2018-03-06 NOTE — PROGRESS NOTES
Subjective   Corinne CEDENO is a 52 y.o. female.     Chief Complaint   Patient presents with   • Diabetes       History of Present Illness       Lost her insurance for three months. Not currently taking her medication other than  Lopressor 25 mg daily, she forgets to take this twice daily as ordered.     Hypothyroidism - out of synthroid, feeling tired.     Type 2 diabetes - average is 131, usually in 120's fasting each morning.  Not been watching her diet. She had quit soda until she ran out of her synthroid. She started back on caffeine and soda for energy.     Hyperlipidemia - not been on cholesterol medications.    Allergic rhinitis - not been on her medication, having some exacerbations.    Cigarette smoker - smokes 2 packs a day. Wants to talk about smoke cessation.    b 12 def - not on supplement.     Vit d def - not currently taking a supplement.         The following portions of the patient's history were reviewed and updated as appropriate: allergies, current medications, past family history, past medical history, past social history, past surgical history and problem list.    Review of Systems   Constitutional: Positive for fatigue. Negative for activity change, appetite change, chills and fever.   HENT: Positive for congestion and rhinorrhea. Negative for ear pain, facial swelling, hearing loss, sinus pressure, sore throat, trouble swallowing and voice change.    Eyes: Positive for discharge. Negative for pain and visual disturbance.   Respiratory: Positive for wheezing. Negative for apnea, cough, chest tightness and shortness of breath.    Cardiovascular: Positive for leg swelling. Negative for chest pain and palpitations.   Gastrointestinal: Negative.  Negative for abdominal pain, blood in stool, constipation, diarrhea, nausea and vomiting.   Endocrine: Negative.    Genitourinary: Negative for dysuria.   Musculoskeletal: Positive for arthralgias and gait problem. Negative for neck stiffness.   Skin:  "Negative for color change.   Allergic/Immunologic: Positive for environmental allergies.   Neurological: Negative for headaches.   Hematological: Negative.    Psychiatric/Behavioral: Negative for confusion, dysphoric mood and suicidal ideas. The patient is not nervous/anxious.    All other systems reviewed and are negative.      Objective     /86  Pulse 82  Temp 98.4 °F (36.9 °C) (Oral)   Ht 165.1 cm (65\")  Wt 121 kg (267 lb)  SpO2 91%  BMI 44.43 kg/m2  Lab on 10/04/2017   Component Date Value Ref Range Status   • Glucose 10/04/2017 114* 70 - 110 mg/dL Final   • BUN 10/04/2017 16  7 - 21 mg/dL Final   • Creatinine 10/04/2017 0.61  0.43 - 1.29 mg/dL Final   • Sodium 10/04/2017 140  135 - 153 mmol/L Final   • Potassium 10/04/2017 4.3  3.5 - 5.3 mmol/L Final   • Chloride 10/04/2017 110  99 - 112 mmol/L Final   • CO2 10/04/2017 25.0  24.3 - 31.9 mmol/L Final   • Calcium 10/04/2017 9.2  7.7 - 10.0 mg/dL Final   • Total Protein 10/04/2017 7.1  6.0 - 8.0 g/dL Final   • Albumin 10/04/2017 4.20  3.50 - 5.00 g/dL Final   • ALT (SGPT) 10/04/2017 19  10 - 36 U/L Final   • AST (SGOT) 10/04/2017 18  10 - 30 U/L Final   • Alkaline Phosphatase 10/04/2017 73  35 - 104 U/L Final   • Total Bilirubin 10/04/2017 0.3  0.2 - 1.8 mg/dL Final   • eGFR Non African Amer 10/04/2017 104  >60 mL/min/1.73 Final   • Globulin 10/04/2017 2.9  gm/dL Final   • A/G Ratio 10/04/2017 1.4* 1.5 - 2.5 g/dL Final   • BUN/Creatinine Ratio 10/04/2017 26.2* 7.0 - 25.0 Final   • Anion Gap 10/04/2017 5.0  3.6 - 11.2 mmol/L Final   • TSH 10/04/2017 17.123* 0.550 - 4.780 mIU/mL Final   • Hemoglobin A1C 10/04/2017 6.20* 4.50 - 5.70 % Final   • 25 Hydroxy, Vitamin D 10/04/2017 17.0  ng/ml Final   • Total Cholesterol 10/04/2017 285* 0 - 200 mg/dL Final   • Triglycerides 10/04/2017 359* 0 - 150 mg/dL Final   • HDL Cholesterol 10/04/2017 50* 60 - 100 mg/dL Final   • LDL Cholesterol  10/04/2017 163* 0 - 100 mg/dL Final   • VLDL Cholesterol 10/04/2017 71.8  " mg/dL Final   • LDL/HDL Ratio 10/04/2017 3.26   Final   • Vitamin B-12 10/04/2017 361  211 - 911 pg/mL Final   • Free T4 10/04/2017 0.91  0.89 - 1.76 ng/dL Final   • Thyroid Peroxidase Antibody 10/04/2017 178* 0 - 34 IU/mL Final   • Thyroglobulin Ab 10/04/2017 29.0* 0.0 - 0.9 IU/mL Final   • WBC 10/04/2017 8.63  4.50 - 12.50 10*3/mm3 Final   • RBC 10/04/2017 4.35  4.20 - 5.40 10*6/mm3 Final   • Hemoglobin 10/04/2017 13.8  12.0 - 16.0 g/dL Final   • Hematocrit 10/04/2017 42.6  37.0 - 47.0 % Final   • MCV 10/04/2017 97.9* 80.0 - 94.0 fL Final   • MCH 10/04/2017 31.7  27.0 - 33.0 pg Final   • MCHC 10/04/2017 32.4* 33.0 - 37.0 g/dL Final   • RDW 10/04/2017 13.2  11.5 - 14.5 % Final   • RDW-SD 10/04/2017 46.1  37.0 - 54.0 fl Final   • MPV 10/04/2017 11.6* 6.0 - 10.0 fL Final   • Platelets 10/04/2017 335  130 - 400 10*3/mm3 Final   • Neutrophil % 10/04/2017 50.0  30.0 - 70.0 % Final   • Lymphocyte % 10/04/2017 35.6  21.0 - 51.0 % Final   • Monocyte % 10/04/2017 8.7  0.0 - 10.0 % Final   • Eosinophil % 10/04/2017 4.8  0.0 - 5.0 % Final   • Basophil % 10/04/2017 0.7  0.0 - 2.0 % Final   • Immature Grans % 10/04/2017 0.2  0.0 - 0.5 % Final   • Neutrophils, Absolute 10/04/2017 4.32  1.40 - 6.50 10*3/mm3 Final   • Lymphocytes, Absolute 10/04/2017 3.07* 1.00 - 3.00 10*3/mm3 Final   • Monocytes, Absolute 10/04/2017 0.75  0.10 - 0.90 10*3/mm3 Final   • Eosinophils, Absolute 10/04/2017 0.41  0.00 - 0.70 10*3/mm3 Final   • Basophils, Absolute 10/04/2017 0.06  0.00 - 0.30 10*3/mm3 Final   • Immature Grans, Absolute 10/04/2017 0.02  0.00 - 0.03 10*3/mm3 Final   • Osmolality Calc 10/04/2017 281.4  273.0 - 305.0 mOsm/kg Final       Physical Exam   Constitutional: She is oriented to person, place, and time. Vital signs are normal. She appears well-developed and well-nourished. No distress.   Visibly obese.    HENT:   Head: Normocephalic and atraumatic.   Right Ear: External ear normal.   Left Ear: External ear normal.   Nose: Nose  normal.   Mouth/Throat: Oropharynx is clear and moist. No oropharyngeal exudate.   Eyes: EOM are normal. Pupils are equal, round, and reactive to light.   Neck: Normal range of motion. Neck supple. No tracheal deviation present. No thyromegaly present.   Cardiovascular: Normal rate, regular rhythm, normal heart sounds and intact distal pulses.    No murmur heard.  Pulmonary/Chest: Effort normal and breath sounds normal. No respiratory distress. She has no wheezes. She has no rales. She exhibits no tenderness.   Abdominal: Soft. Bowel sounds are normal. She exhibits no distension and no mass. There is no tenderness. There is no rebound and no guarding.   Musculoskeletal:        Left knee: She exhibits decreased range of motion. Tenderness found. Patellar tendon tenderness noted.        Lumbar back: She exhibits decreased range of motion, tenderness, pain and spasm.   Decreased lumbar curvature, there is pain with forward flexion. DTR's +2. No edema.     Right index finger with large wart.    Lymphadenopathy:     She has no cervical adenopathy.   Neurological: She is alert and oriented to person, place, and time. She has normal reflexes.   Skin: Skin is warm and dry. No rash noted. She is not diaphoretic. No erythema. No pallor.   Psychiatric: She has a normal mood and affect. Her behavior is normal. Judgment and thought content normal. Her affect is not inappropriate. Cognition and memory are normal.   Denies thoughts of hurting self or others.   Nursing note and vitals reviewed.      Assessment/Plan     Problem List Items Addressed This Visit        Cardiovascular and Mediastinum    Essential hypertension    Relevant Medications    metoprolol tartrate (LOPRESSOR) 25 MG tablet    Other Relevant Orders    MicroAlbumin, Urine, Random - Urine, Clean Catch    Type 2 diabetes mellitus with circulatory disorder    Relevant Medications    glucose blood test strip    DROPLET LANCETS ULTRA THIN 30G misc    Other Relevant  Orders    Hemoglobin A1c    Lipid Panel    Mixed hyperlipidemia    Relevant Medications    fenofibrate micronized (LOFIBRA) 67 MG capsule    ezetimibe (ZETIA) 10 MG tablet       Digestive    Vitamin D deficiency disease    Relevant Medications    Cholecalciferol (VITAMIN D3) 23164 units capsule    Other Relevant Orders    Vitamin D 25 Hydroxy    Vitamin B 12 deficiency    Relevant Orders    Vitamin B12       Endocrine    Acquired hypothyroidism    Relevant Medications    metoprolol tartrate (LOPRESSOR) 25 MG tablet    levothyroxine (SYNTHROID, LEVOTHROID) 112 MCG tablet    cyclobenzaprine (FLEXERIL) 10 MG tablet    Other Relevant Orders    CBC & Differential    Comprehensive Metabolic Panel    TSH    Uric Acid       Musculoskeletal and Integument    Primary osteoarthritis involving multiple joints    Overview     Both knees, worse in left knee , history of rooster comb injections          Relevant Medications    celecoxib (CeleBREX) 200 MG capsule       Other    Tobacco abuse    Relevant Medications    varenicline (CHANTIX CONTINUING MONTH TERRANCE) 1 MG tablet    varenicline (CHANTIX) 0.5 MG tablet    Cigarette smoker two packs a day or less - Primary    Relevant Medications    varenicline (CHANTIX CONTINUING MONTH TERRANCE) 1 MG tablet    varenicline (CHANTIX) 0.5 MG tablet      Other Visit Diagnoses     Non-seasonal allergic rhinitis due to other allergic trigger, unspecified chronicity        Relevant Medications    fluticasone (FLONASE) 50 MCG/ACT nasal spray        Smoke cessation education provided. Discussed community programs as well as medical options to assist with cessation. Total time today spent on smoke cessation education 11 minutes. We have discussed the risk versus benefits of nicotine patches, Wellbutrin and Chantix.  We have discussed methods to distract from the habit of having a cigarette in hand such as carrying a strong chewing gum.  Individualized plan of care has been developed. We will start Chantix  starter pack today.     I have discussed diagnosis in detail today allowing time for questions and answers. Pt is aware of reasons to seek urgent or emergent medical care as well as reasons to return to the clinic for evaluation. Possible side effects, interactions and progression of symptoms discussed as well. Pt / family states understanding.   Emotional support and active listening provided.     Schedule patient for wart removal and left knee joint injection next week.     Start back on all medications. Add celebrex for arthritis pain. No additional anti-inflammatory medication. Common anti-inflammatory medications reviewed.     Follow up one week, sooner if needed. Routine labs in three months and routine follow ups every three months.               This document has been electronically signed by:  RAFAEL Mazariegos, NP-C

## 2018-03-13 ENCOUNTER — PROCEDURE VISIT (OUTPATIENT)
Dept: FAMILY MEDICINE CLINIC | Facility: CLINIC | Age: 53
End: 2018-03-13

## 2018-03-13 VITALS
OXYGEN SATURATION: 94 % | HEIGHT: 65 IN | WEIGHT: 267 LBS | BODY MASS INDEX: 44.48 KG/M2 | HEART RATE: 110 BPM | TEMPERATURE: 98.3 F

## 2018-03-13 DIAGNOSIS — L98.9 LESION OF FINGER: ICD-10-CM

## 2018-03-13 DIAGNOSIS — E78.2 MIXED HYPERLIPIDEMIA: Primary | ICD-10-CM

## 2018-03-13 DIAGNOSIS — M17.12 PRIMARY OSTEOARTHRITIS OF LEFT KNEE: ICD-10-CM

## 2018-03-13 DIAGNOSIS — E11.51 TYPE 2 DIABETES MELLITUS WITH DIABETIC PERIPHERAL ANGIOPATHY WITHOUT GANGRENE, WITHOUT LONG-TERM CURRENT USE OF INSULIN (HCC): ICD-10-CM

## 2018-03-13 PROCEDURE — 20610 DRAIN/INJ JOINT/BURSA W/O US: CPT | Performed by: NURSE PRACTITIONER

## 2018-03-13 PROCEDURE — 99214 OFFICE O/P EST MOD 30 MIN: CPT | Performed by: NURSE PRACTITIONER

## 2018-03-13 PROCEDURE — 17110 DESTRUCTION B9 LES UP TO 14: CPT | Performed by: NURSE PRACTITIONER

## 2018-03-13 RX ORDER — SIMVASTATIN 40 MG
40 TABLET ORAL NIGHTLY
Qty: 30 TABLET | Refills: 5 | Status: SHIPPED | OUTPATIENT
Start: 2018-03-13 | End: 2018-04-02

## 2018-03-13 NOTE — ASSESSMENT & PLAN NOTE
Diabetes is improving with treatment.   Reminded to bring in blood sugar diary at next visit.  Dietary recommendations for ADA diet.  Regular aerobic exercise.  Discussed sick day management.  watch closely due to steroid injection report readings over 250   Diabetes will be reassessed in 3 months.

## 2018-03-13 NOTE — PROGRESS NOTES
Subjective   Corinne CEDENO is a 52 y.o. female.     Chief Complaint   Patient presents with   • Other     knee inj       History of Present Illness       Left knee osteoarthritis-requesting to injection today.  Pain and stiffness for multiple years.  Has had joint injections in this knee in the past.    Hyperlipidemia-patient states that her insurance will not cover Zetia.  She is requesting a change.  She did not do well with Lipitor due to joint pain and muscle weakness.    Type 2 diabetes with circulatory disorder-checking glucose.  Watching diet.  Compliant with medication at this time.  Glucose has been mildly elevated.  She understands effects of steroid injection on diabetes.    Lesion on the right index finger-tender lesion that is catching on her clothing and is painful.    Hypertension-patient reports that she is nervous about joint injection and lesion removal today.  She does monitor her blood pressure randomly and it has been improved with metoprolol.      The following portions of the patient's history were reviewed and updated as appropriate: allergies, current medications, past family history, past medical history, past social history, past surgical history and problem list.    Review of Systems   Constitutional: Positive for activity change and fatigue. Negative for appetite change, chills, fever and unexpected weight change.   HENT: Positive for ear pain.    Eyes: Positive for itching.   Respiratory: Negative.    Cardiovascular: Positive for leg swelling.   Gastrointestinal: Positive for constipation.   Endocrine: Negative.    Genitourinary: Negative.    Musculoskeletal: Positive for arthralgias, back pain and joint swelling.   Skin: Negative for pallor and rash.   Allergic/Immunologic: Positive for environmental allergies. Negative for food allergies.   Neurological: Negative for seizures and light-headedness.   Hematological: Bruises/bleeds easily.   Psychiatric/Behavioral: Negative for dysphoric  "mood, self-injury and suicidal ideas. The patient is not nervous/anxious.        Objective     Pulse 110   Temp 98.3 °F (36.8 °C) (Oral)   Ht 165.1 cm (65\")   Wt 121 kg (267 lb)   SpO2 94%   BMI 44.43 kg/m²       Physical Exam   Constitutional: She is oriented to person, place, and time. Vital signs are normal. She appears well-developed and well-nourished. No distress.   Visibly obese.    HENT:   Head: Normocephalic and atraumatic.   Mouth/Throat: No oropharyngeal exudate.   Eyes: EOM are normal. Pupils are equal, round, and reactive to light.   Neck: Normal range of motion. Neck supple. No tracheal deviation present. No thyromegaly present.   Cardiovascular: Normal rate, regular rhythm, normal heart sounds and intact distal pulses.    No murmur heard.  Pulmonary/Chest: Effort normal and breath sounds normal. No respiratory distress.   Abdominal: Soft. Bowel sounds are normal.   Musculoskeletal:        Left knee: She exhibits decreased range of motion. She exhibits no deformity and no erythema. Tenderness found. Medial joint line and lateral joint line tenderness noted.   Procedure: Large joint injection of left knee intra-articularly today  The procedure risks and benefits were explained to patient and patient gave verbal consent to have the procedure performed.  Indication:  Left knee  osteoarthritis  Provider: RAFAEL Mazariegos   Description: The left knee  was prepped and draped in sterile fashion.  An injection was given into the joint intramuscularly using 3 cc of 1% lidocaine, 1 cc Decadron, and 1 cc of Kenalog.  Pressure was held and sterile dressings were placed.  No complications.   Assessment blood loss: Minimal  Patient tolerated the procedure well.     1% lidocaine 500 mg per 50 mL NDC # 0409-427 6-17  Kenalog 40 mg per 1 mL NDC # 0003-029 3-2 8  Decadron 4 mg per 1 mL NDC # 6332 3-1 6 5-0 1   Lymphadenopathy:     She has no cervical adenopathy.   Neurological: She is alert and oriented to " "person, place, and time.   Skin: Skin is warm and dry. Lesion (right index finger ) noted. No rash noted. She is not diaphoretic.   Right index finger cryotherapy, tolerated well   Psychiatric: She has a normal mood and affect. Her behavior is normal. Judgment and thought content normal.   Vitals reviewed.      Assessment/Plan     Problem List Items Addressed This Visit        Cardiovascular and Mediastinum    Type 2 diabetes mellitus with circulatory disorder    Current Assessment & Plan     Diabetes is improving with treatment.   Reminded to bring in blood sugar diary at next visit.  Dietary recommendations for ADA diet.  Regular aerobic exercise.  Discussed sick day management.  watch closely due to steroid injection report readings over 250   Diabetes will be reassessed in 3 months.         Mixed hyperlipidemia - Primary    Relevant Medications    simvastatin (ZOCOR) 40 MG tablet       Musculoskeletal and Integument    Primary osteoarthritis of left knee       Other    Lesion of finger    Overview     Painful tender          Current Assessment & Plan     Cryotherapy performed today, tolerated well.           Other Visit Diagnoses    None.         Reviewed treatment options and agreed on a trial of cryotherapy. See procedure note.  Patient has been educated today regarding procedure.  We have discussed the risk versus benefits of this procedure including \"red man\" syndrome, infection, injury, discomfort and allergic reaction.  Patient is aware that this is not an all inclusive list of possible side effects.  We have discussed alternative treatments.  The patient verbalizes understanding of this information.  Their questions were sought and answered.  Patient verbalizes consent to proceed with procedure today.  I have discussed with the patient that full benefit of joint injection may not be felt for several weeks.  They may experience soreness but should not experience increased pain.  Remain mobile and perform " range of motion to the affected joint often throughout the day but avoid overuse for the next several days.  Return to usual activity in one week.  Patient has been advised to call the office or return to the office with any questions.  We have also discussed reasons to seek urgent or emergent medical care.  Patient states understanding of all instructions.  Stop Zetia.  Start Zocor.  Reviewed possible side effects.  I have discussed diagnosis in detail today allowing time for questions and answers. Pt is aware of reasons to seek urgent or emergent medical care as well as reasons to return to the clinic for evaluation. Possible side effects, interactions and progression of symptoms discussed as well. Pt / family states understanding.    Follow up in 3 months. Routine labs fasting one week prior to next office visit. Return sooner if needed.            This document has been electronically signed by:  RAFAEL Mazariegos, NP-C

## 2018-03-14 ENCOUNTER — TELEPHONE (OUTPATIENT)
Dept: FAMILY MEDICINE CLINIC | Facility: CLINIC | Age: 53
End: 2018-03-14

## 2018-03-14 DIAGNOSIS — Z86.39 HISTORY OF THYROID NODULE: Primary | ICD-10-CM

## 2018-03-14 NOTE — TELEPHONE ENCOUNTER
----- Message from RAFAEL Alex sent at 3/14/2018 12:37 PM EDT -----  Please notify patient that I have reviewed her thyroid ultrasound.  She does have an enlarged thyroid and a couple of nodules that appear to be benign.  Now that she is back on her Synthroid I would like to wait 3 months and repeat the ultrasound to see if the areas respond to her therapy.  Please place the order and schedule her repeat thyroid ultrasound for May 2018.    Spoke with derek and let her know about her ultrasound  And that we will  Repeat ultrasound in may 2018

## 2018-03-14 NOTE — TELEPHONE ENCOUNTER
----- Message from RAFAEL Alex sent at 3/14/2018 12:37 PM EDT -----  Please notify patient that I have reviewed her thyroid ultrasound.  She does have an enlarged thyroid and a couple of nodules that appear to be benign.  Now that she is back on her Synthroid I would like to wait 3 months and repeat the ultrasound to see if the areas respond to her therapy.  Please place the order and schedule her repeat thyroid ultrasound for May 2018.

## 2018-04-02 ENCOUNTER — OFFICE VISIT (OUTPATIENT)
Dept: FAMILY MEDICINE CLINIC | Facility: CLINIC | Age: 53
End: 2018-04-02

## 2018-04-02 VITALS
DIASTOLIC BLOOD PRESSURE: 90 MMHG | WEIGHT: 263 LBS | BODY MASS INDEX: 43.82 KG/M2 | HEART RATE: 88 BPM | HEIGHT: 65 IN | SYSTOLIC BLOOD PRESSURE: 132 MMHG | OXYGEN SATURATION: 94 % | TEMPERATURE: 98.1 F

## 2018-04-02 DIAGNOSIS — J06.9 ACUTE URI: Primary | ICD-10-CM

## 2018-04-02 LAB
EXPIRATION DATE: NORMAL
INTERNAL CONTROL: NORMAL
Lab: NORMAL
S PYO AG THROAT QL: NEGATIVE

## 2018-04-02 PROCEDURE — 87880 STREP A ASSAY W/OPTIC: CPT | Performed by: NURSE PRACTITIONER

## 2018-04-02 PROCEDURE — 99213 OFFICE O/P EST LOW 20 MIN: CPT | Performed by: NURSE PRACTITIONER

## 2018-04-02 PROCEDURE — 96372 THER/PROPH/DIAG INJ SC/IM: CPT | Performed by: NURSE PRACTITIONER

## 2018-04-02 RX ORDER — CEFTRIAXONE 1 G/1
1 INJECTION, POWDER, FOR SOLUTION INTRAMUSCULAR; INTRAVENOUS ONCE
Status: COMPLETED | OUTPATIENT
Start: 2018-04-02 | End: 2018-04-02

## 2018-04-02 RX ORDER — GUAIFENESIN/DEXTROMETHORPHAN 100-10MG/5
5 SYRUP ORAL 3 TIMES DAILY PRN
Qty: 236 ML | Refills: 0 | Status: SHIPPED | OUTPATIENT
Start: 2018-04-02 | End: 2018-06-24

## 2018-04-02 RX ORDER — AZITHROMYCIN 250 MG/1
TABLET, FILM COATED ORAL
Qty: 6 TABLET | Refills: 0 | Status: SHIPPED | OUTPATIENT
Start: 2018-04-02 | End: 2018-04-10 | Stop reason: SDUPTHER

## 2018-04-02 RX ADMIN — CEFTRIAXONE 1 G: 1 INJECTION, POWDER, FOR SOLUTION INTRAMUSCULAR; INTRAVENOUS at 11:52

## 2018-04-02 NOTE — PROGRESS NOTES
"Stephanie CEDENO is a 52 y.o. female.     Chief Complaint   Patient presents with   • URI       History of Present Illness     Cold/allergy symptoms - patient reports cold-like symptoms for the past few days.  See the review of systems for further history of present illness. Moderate intensity. Currently taking Flonase and Histamine blocker.       The following portions of the patient's history were reviewed and updated as appropriate: allergies, current medications, past family history, past medical history, past social history, past surgical history and problem list.    Review of Systems   Constitutional: Positive for activity change, appetite change, chills and fatigue. Negative for fever.   HENT: Positive for ear pain, sinus pressure, sore throat, trouble swallowing and voice change. Negative for congestion and sinus pain.    Eyes: Positive for discharge.   Respiratory: Positive for cough. Negative for choking, chest tightness, shortness of breath and wheezing.    Cardiovascular: Negative for chest pain, palpitations and leg swelling.   Gastrointestinal: Negative.    Endocrine: Negative.    Genitourinary: Negative.  Negative for dysuria.   Musculoskeletal: Positive for arthralgias and back pain. Negative for neck stiffness.   Skin: Negative.    Allergic/Immunologic: Positive for environmental allergies.   Neurological: Positive for dizziness, light-headedness and headaches.   Hematological: Bruises/bleeds easily.   Psychiatric/Behavioral: Negative for self-injury and suicidal ideas.       Objective     /90   Pulse 88   Temp 98.1 °F (36.7 °C) (Tympanic)   Ht 165.1 cm (65\")   Wt 119 kg (263 lb)   SpO2 94%   BMI 43.77 kg/m²   Lab on 10/04/2017   Component Date Value Ref Range Status   • Glucose 10/04/2017 114* 70 - 110 mg/dL Final   • BUN 10/04/2017 16  7 - 21 mg/dL Final   • Creatinine 10/04/2017 0.61  0.43 - 1.29 mg/dL Final   • Sodium 10/04/2017 140  135 - 153 mmol/L Final   • Potassium " 10/04/2017 4.3  3.5 - 5.3 mmol/L Final   • Chloride 10/04/2017 110  99 - 112 mmol/L Final   • CO2 10/04/2017 25.0  24.3 - 31.9 mmol/L Final   • Calcium 10/04/2017 9.2  7.7 - 10.0 mg/dL Final   • Total Protein 10/04/2017 7.1  6.0 - 8.0 g/dL Final   • Albumin 10/04/2017 4.20  3.50 - 5.00 g/dL Final   • ALT (SGPT) 10/04/2017 19  10 - 36 U/L Final   • AST (SGOT) 10/04/2017 18  10 - 30 U/L Final   • Alkaline Phosphatase 10/04/2017 73  35 - 104 U/L Final   • Total Bilirubin 10/04/2017 0.3  0.2 - 1.8 mg/dL Final   • eGFR Non African Amer 10/04/2017 104  >60 mL/min/1.73 Final   • Globulin 10/04/2017 2.9  gm/dL Final   • A/G Ratio 10/04/2017 1.4* 1.5 - 2.5 g/dL Final   • BUN/Creatinine Ratio 10/04/2017 26.2* 7.0 - 25.0 Final   • Anion Gap 10/04/2017 5.0  3.6 - 11.2 mmol/L Final   • TSH 10/04/2017 17.123* 0.550 - 4.780 mIU/mL Final   • Hemoglobin A1C 10/04/2017 6.20* 4.50 - 5.70 % Final   • 25 Hydroxy, Vitamin D 10/04/2017 17.0  ng/ml Final   • Total Cholesterol 10/04/2017 285* 0 - 200 mg/dL Final   • Triglycerides 10/04/2017 359* 0 - 150 mg/dL Final   • HDL Cholesterol 10/04/2017 50* 60 - 100 mg/dL Final   • LDL Cholesterol  10/04/2017 163* 0 - 100 mg/dL Final   • VLDL Cholesterol 10/04/2017 71.8  mg/dL Final   • LDL/HDL Ratio 10/04/2017 3.26   Final   • Vitamin B-12 10/04/2017 361  211 - 911 pg/mL Final   • Free T4 10/04/2017 0.91  0.89 - 1.76 ng/dL Final   • Thyroid Peroxidase Antibody 10/05/2017 178* 0 - 34 IU/mL Final   • Thyroglobulin Ab 10/05/2017 29.0* 0.0 - 0.9 IU/mL Final   • WBC 10/04/2017 8.63  4.50 - 12.50 10*3/mm3 Final   • RBC 10/04/2017 4.35  4.20 - 5.40 10*6/mm3 Final   • Hemoglobin 10/04/2017 13.8  12.0 - 16.0 g/dL Final   • Hematocrit 10/04/2017 42.6  37.0 - 47.0 % Final   • MCV 10/04/2017 97.9* 80.0 - 94.0 fL Final   • MCH 10/04/2017 31.7  27.0 - 33.0 pg Final   • MCHC 10/04/2017 32.4* 33.0 - 37.0 g/dL Final   • RDW 10/04/2017 13.2  11.5 - 14.5 % Final   • RDW-SD 10/04/2017 46.1  37.0 - 54.0 fl Final   •  MPV 10/04/2017 11.6* 6.0 - 10.0 fL Final   • Platelets 10/04/2017 335  130 - 400 10*3/mm3 Final   • Neutrophil % 10/04/2017 50.0  30.0 - 70.0 % Final   • Lymphocyte % 10/04/2017 35.6  21.0 - 51.0 % Final   • Monocyte % 10/04/2017 8.7  0.0 - 10.0 % Final   • Eosinophil % 10/04/2017 4.8  0.0 - 5.0 % Final   • Basophil % 10/04/2017 0.7  0.0 - 2.0 % Final   • Immature Grans % 10/04/2017 0.2  0.0 - 0.5 % Final   • Neutrophils, Absolute 10/04/2017 4.32  1.40 - 6.50 10*3/mm3 Final   • Lymphocytes, Absolute 10/04/2017 3.07* 1.00 - 3.00 10*3/mm3 Final   • Monocytes, Absolute 10/04/2017 0.75  0.10 - 0.90 10*3/mm3 Final   • Eosinophils, Absolute 10/04/2017 0.41  0.00 - 0.70 10*3/mm3 Final   • Basophils, Absolute 10/04/2017 0.06  0.00 - 0.30 10*3/mm3 Final   • Immature Grans, Absolute 10/04/2017 0.02  0.00 - 0.03 10*3/mm3 Final   • Osmolality Calc 10/04/2017 281.4  273.0 - 305.0 mOsm/kg Final       Physical Exam   Constitutional: She is oriented to person, place, and time. She appears well-developed and well-nourished. She has a sickly appearance.   Appears acutely ill.    HENT:   Head: Normocephalic.   Right Ear: Hearing normal. No lacerations. No drainage or swelling. No foreign bodies. No mastoid tenderness. A middle ear effusion is present.   Left Ear: Hearing normal. No lacerations. No drainage or swelling. No foreign bodies. No mastoid tenderness. A middle ear effusion is present.   Nose: Mucosal edema and rhinorrhea present. No nose lacerations, sinus tenderness or nasal deformity. No epistaxis.  No foreign bodies.   Mouth/Throat: Uvula is midline. Oropharyngeal exudate and posterior oropharyngeal erythema present. No tonsillar abscesses.   TM's are cloudy bilateral   Eyes: EOM are normal. Pupils are equal, round, and reactive to light.   Neck: Normal range of motion. Neck supple. No thyromegaly present.   Cardiovascular: Normal rate, regular rhythm, normal heart sounds and intact distal pulses.    Pulmonary/Chest:  Effort normal and breath sounds normal. No respiratory distress.   Cough noted    Abdominal: Soft. Bowel sounds are normal. She exhibits no distension. There is no tenderness. There is no guarding.   Lymphadenopathy:     She has cervical adenopathy.        Right cervical: Superficial cervical adenopathy present. No deep cervical adenopathy present.       Left cervical: Superficial cervical adenopathy present. No deep cervical adenopathy present.   Neurological: She is alert and oriented to person, place, and time. She has normal reflexes.   Skin: Skin is warm and dry. No rash noted.   Psychiatric: She has a normal mood and affect. Her behavior is normal. Judgment and thought content normal.       Assessment/Plan     Problem List Items Addressed This Visit        Respiratory    Acute URI - Primary    Relevant Medications    cefTRIAXone (ROCEPHIN) injection 1 g (Start on 4/2/2018 12:15 PM)    azithromycin (ZITHROMAX Z-TERRANCE) 250 MG tablet      Other Visit Diagnoses    None.       Tolerated Rocephin in the past.   Fluids, rest, symptomatic treatment advised. Steam therapy. Dispose of tooth bush after 24 hours of starting antibiotics if ordered. Complete antibiotics as ordered.  Discussed possible side effects/interactions of medication. Discussed symptoms to report as well as reasons to seek urgent or emergent medical attention. Understanding stated.   Recommend follow up in 2-3 days if not improved, sooner if needed.   Rocephin injection today.   Start zpack and robitussin.     Errors in dictation may reflect use of voice recognition software and not all errors in transcription may have been detected prior to signing.                  This document has been electronically signed by:  RAFAEL Mazariegos, NP-C

## 2018-04-10 ENCOUNTER — TELEPHONE (OUTPATIENT)
Dept: FAMILY MEDICINE CLINIC | Facility: CLINIC | Age: 53
End: 2018-04-10

## 2018-04-10 RX ORDER — MECLIZINE HYDROCHLORIDE 25 MG/1
25 TABLET ORAL 3 TIMES DAILY PRN
Qty: 90 TABLET | Refills: 0 | Status: SHIPPED | OUTPATIENT
Start: 2018-04-10

## 2018-04-10 RX ORDER — AZITHROMYCIN 250 MG/1
TABLET, FILM COATED ORAL
Qty: 6 TABLET | Refills: 0 | Status: SHIPPED | OUTPATIENT
Start: 2018-04-10 | End: 2018-06-24

## 2018-04-10 NOTE — TELEPHONE ENCOUNTER
Patient called wanting to know if vinnie would send her in something for ear ache, nausea, and dizziness. Vinnie gave the okay to send in some meclizine.

## 2018-06-24 ENCOUNTER — OFFICE VISIT (OUTPATIENT)
Dept: RETAIL CLINIC | Facility: CLINIC | Age: 53
End: 2018-06-24

## 2018-06-24 VITALS
WEIGHT: 268.6 LBS | HEART RATE: 90 BPM | TEMPERATURE: 99 F | BODY MASS INDEX: 44.75 KG/M2 | SYSTOLIC BLOOD PRESSURE: 120 MMHG | OXYGEN SATURATION: 95 % | HEIGHT: 65 IN | DIASTOLIC BLOOD PRESSURE: 70 MMHG | RESPIRATION RATE: 14 BRPM

## 2018-06-24 DIAGNOSIS — R05.9 COUGH: ICD-10-CM

## 2018-06-24 DIAGNOSIS — H66.90 EAR INFECTION: Primary | ICD-10-CM

## 2018-06-24 DIAGNOSIS — F17.200 CURRENT SMOKER: ICD-10-CM

## 2018-06-24 PROCEDURE — 99213 OFFICE O/P EST LOW 20 MIN: CPT | Performed by: NURSE PRACTITIONER

## 2018-06-24 RX ORDER — CEFDINIR 300 MG/1
300 CAPSULE ORAL 2 TIMES DAILY
Qty: 20 CAPSULE | Refills: 0 | Status: SHIPPED | OUTPATIENT
Start: 2018-06-24 | End: 2018-07-04

## 2018-06-24 RX ORDER — DEXTROMETHORPHAN HYDROBROMIDE AND PROMETHAZINE HYDROCHLORIDE 15; 6.25 MG/5ML; MG/5ML
5 SYRUP ORAL NIGHTLY PRN
Qty: 150 ML | Refills: 0 | Status: SHIPPED | OUTPATIENT
Start: 2018-06-24

## 2018-06-24 RX ORDER — FLUCONAZOLE 150 MG/1
150 TABLET ORAL EVERY OTHER DAY
Qty: 2 TABLET | Refills: 0 | Status: SHIPPED | OUTPATIENT
Start: 2018-06-24

## 2018-06-24 NOTE — PATIENT INSTRUCTIONS
Steps to Quit Smoking  Smoking tobacco can be bad for your health. It can also affect almost every organ in your body. Smoking puts you and people around you at risk for many serious long-lasting (chronic) diseases. Quitting smoking is hard, but it is one of the best things that you can do for your health. It is never too late to quit.  What are the benefits of quitting smoking?  When you quit smoking, you lower your risk for getting serious diseases and conditions. They can include:  · Lung cancer or lung disease.  · Heart disease.  · Stroke.  · Heart attack.  · Not being able to have children (infertility).  · Weak bones (osteoporosis) and broken bones (fractures).    If you have coughing, wheezing, and shortness of breath, those symptoms may get better when you quit. You may also get sick less often. If you are pregnant, quitting smoking can help to lower your chances of having a baby of low birth weight.  What can I do to help me quit smoking?  Talk with your doctor about what can help you quit smoking. Some things you can do (strategies) include:  · Quitting smoking totally, instead of slowly cutting back how much you smoke over a period of time.  · Going to in-person counseling. You are more likely to quit if you go to many counseling sessions.  · Using resources and support systems, such as:  ? Online chats with a counselor.  ? Phone quitlines.  ? Printed self-help materials.  ? Support groups or group counseling.  ? Text messaging programs.  ? Mobile phone apps or applications.  · Taking medicines. Some of these medicines may have nicotine in them. If you are pregnant or breastfeeding, do not take any medicines to quit smoking unless your doctor says it is okay. Talk with your doctor about counseling or other things that can help you.    Talk with your doctor about using more than one strategy at the same time, such as taking medicines while you are also going to in-person counseling. This can help make  quitting easier.  What things can I do to make it easier to quit?  Quitting smoking might feel very hard at first, but there is a lot that you can do to make it easier. Take these steps:  · Talk to your family and friends. Ask them to support and encourage you.  · Call phone quitlines, reach out to support groups, or work with a counselor.  · Ask people who smoke to not smoke around you.  · Avoid places that make you want (trigger) to smoke, such as:  ? Bars.  ? Parties.  ? Smoke-break areas at work.  · Spend time with people who do not smoke.  · Lower the stress in your life. Stress can make you want to smoke. Try these things to help your stress:  ? Getting regular exercise.  ? Deep-breathing exercises.  ? Yoga.  ? Meditating.  ? Doing a body scan. To do this, close your eyes, focus on one area of your body at a time from head to toe, and notice which parts of your body are tense. Try to relax the muscles in those areas.  · Download or buy apps on your mobile phone or tablet that can help you stick to your quit plan. There are many free apps, such as QuitGuide from the CDC (Centers for Disease Control and Prevention). You can find more support from smokefree.gov and other websites.    This information is not intended to replace advice given to you by your health care provider. Make sure you discuss any questions you have with your health care provider.  Document Released: 10/14/2010 Document Revised: 08/15/2017 Document Reviewed: 05/03/2016  [employer] Interactive Patient Education © 2018 [employer]  Otitis Media, Adult  Otitis media is redness, soreness, and puffiness (swelling) in the space just behind your eardrum (middle ear). It may be caused by allergies or infection. It often happens along with a cold.  Follow these instructions at home:  · Take your medicine as told. Finish it even if you start to feel better.  · Only take over-the-counter or prescription medicines for pain, discomfort, or fever as told  by your doctor.  · Follow up with your doctor as told.  Contact a doctor if:  · You have otitis media only in one ear, or bleeding from your nose, or both.  · You notice a lump on your neck.  · You are not getting better in 3-5 days.  · You feel worse instead of better.  Get help right away if:  · You have pain that is not helped with medicine.  · You have puffiness, redness, or pain around your ear.  · You get a stiff neck.  · You cannot move part of your face (paralysis).  · You notice that the bone behind your ear hurts when you touch it.  This information is not intended to replace advice given to you by your health care provider. Make sure you discuss any questions you have with your health care provider.  Document Released: 06/05/2009 Document Revised: 05/25/2017 Document Reviewed: 07/15/2014  [employer] Interactive Patient Education © 2017 [employer]

## 2018-06-24 NOTE — PROGRESS NOTES
"Corinne presents to the clinic today c/o upper respiratory symptoms which started over a week ago. Associated symptoms include nasal congestion, ear pain, PND, rhinorrhea, sinus pressure and sore throat. For the past 48 hours her symptoms have worsened. She has tried several otc medications  without adequate relief.     URI    This is a new problem. The current episode started 1 to 4 weeks ago. The problem has been gradually worsening. Maximum temperature: Tactile. The fever has been present for 1 to 2 days. Associated symptoms include congestion, coughing, ear pain, headaches, a plugged ear sensation, rhinorrhea, a sore throat and wheezing. Pertinent negatives include no chest pain, nausea, rash or swollen glands. Sinus pain: Sinus pressure. She has tried increased fluids (Rest) for the symptoms. The treatment provided no relief.      Refer to ROS for additional information.  Vitals:    06/24/18 1530   BP: 120/70   BP Location: Left arm   Patient Position: Sitting   Cuff Size: Adult   Pulse: 90   Resp: 14   Temp: 99 °F (37.2 °C)   TempSrc: Oral   SpO2: 95%   Weight: 122 kg (268 lb 9.6 oz)   Height: 165.1 cm (65\")      The following portions of the patient's history were reviewed and updated as appropriate: allergies, current medications, past family history, past medical history, past social history, past surgical history and problem list.    Review of Systems   Constitutional: Positive for appetite change, fatigue and fever.   HENT: Positive for congestion, ear pain, postnasal drip, rhinorrhea, sinus pressure and sore throat. Sinus pain: Sinus pressure.    Respiratory: Positive for cough and wheezing. Negative for shortness of breath.    Cardiovascular: Negative for chest pain.   Gastrointestinal: Negative for nausea.   Skin: Negative for rash.   Neurological: Positive for headaches.   Hematological: Positive for adenopathy.   All other systems reviewed and are negative.    Physical Exam   Constitutional: She is " oriented to person, place, and time. She appears well-developed and well-nourished. No distress.   HENT:   Head: Normocephalic.   Right Ear: Ear canal normal. No drainage. No mastoid tenderness. Tympanic membrane is erythematous and bulging.   Left Ear: Ear canal normal. No drainage. No mastoid tenderness. Tympanic membrane is erythematous and bulging.   Nose: Mucosal edema and rhinorrhea present. Right sinus exhibits maxillary sinus tenderness. Right sinus exhibits no frontal sinus tenderness. Left sinus exhibits maxillary sinus tenderness. Left sinus exhibits no frontal sinus tenderness.   Mouth/Throat: Mucous membranes are normal. Posterior oropharyngeal erythema present. No oropharyngeal exudate.   Eyes: Conjunctivae are normal. Pupils are equal, round, and reactive to light. Right eye exhibits no discharge. Left eye exhibits no discharge. No scleral icterus.   Neck: Neck supple.   Cardiovascular: Normal rate, regular rhythm and normal heart sounds.  Exam reveals no friction rub.    No murmur heard.  Pulmonary/Chest: Effort normal. No respiratory distress. She has decreased breath sounds in the right upper field and the left upper field. She has wheezes. She has no rhonchi. She has no rales.   Lymphadenopathy:        Head (right side): No tonsillar adenopathy present.        Head (left side): No tonsillar adenopathy present.     She has no cervical adenopathy.   Neurological: She is alert and oriented to person, place, and time.   Skin: Skin is warm and dry. No rash noted. No erythema.   Psychiatric: She has a normal mood and affect. Her behavior is normal. Judgment and thought content normal.   Vitals reviewed.    Assessment/Plan   Problems Addressed this Visit     None      Visit Diagnoses     Ear infection    -  Primary    Findings and recommendations discussed with Corinne. Treatment options reviewed. Counseled regarding supportive care measures.     Relevant Medications    cefdinir (OMNICEF) 300 MG capsule     Cough         Counseled regarding supportive care measures.     Current smoker        Smoking Cessation encouraged.         Findings and recommendations discussed with Corinne. Treatment options reviewed. Counseled regarding supportive care measures. Provided information to assist with smoking cessation for her to consider. Encouraged her to seek further medical evaluation if symptoms worsen or do not improve within 48-72 hours. She does have a nebulizer at home and Albuterol if her wheezing continues.

## 2019-08-01 ENCOUNTER — TELEPHONE (OUTPATIENT)
Dept: SURGERY | Facility: CLINIC | Age: 54
End: 2019-08-01

## 2019-08-02 ENCOUNTER — OFFICE VISIT (OUTPATIENT)
Dept: SURGERY | Facility: CLINIC | Age: 54
End: 2019-08-02

## 2019-08-02 ENCOUNTER — TELEPHONE (OUTPATIENT)
Dept: SURGERY | Facility: CLINIC | Age: 54
End: 2019-08-02

## 2019-08-02 VITALS
WEIGHT: 274 LBS | HEIGHT: 65 IN | HEART RATE: 77 BPM | DIASTOLIC BLOOD PRESSURE: 99 MMHG | BODY MASS INDEX: 45.65 KG/M2 | SYSTOLIC BLOOD PRESSURE: 154 MMHG

## 2019-08-02 DIAGNOSIS — E07.9 DISEASE OF THYROID GLAND: Primary | ICD-10-CM

## 2019-08-02 DIAGNOSIS — E04.1 THYROID NODULE: Primary | ICD-10-CM

## 2019-08-02 PROCEDURE — 99242 OFF/OP CONSLTJ NEW/EST SF 20: CPT | Performed by: SURGERY

## 2019-08-02 RX ORDER — METOPROLOL SUCCINATE 25 MG/1
25 TABLET, EXTENDED RELEASE ORAL DAILY
Refills: 3 | COMMUNITY
Start: 2019-06-13

## 2019-08-02 RX ORDER — ASPIRIN 81 MG/1
81 TABLET, COATED ORAL DAILY
Refills: 3 | COMMUNITY
Start: 2019-07-08

## 2019-08-02 RX ORDER — BUPROPION HYDROCHLORIDE 150 MG/1
150 TABLET ORAL DAILY
Refills: 0 | COMMUNITY
Start: 2019-07-26

## 2019-08-02 RX ORDER — ALBUTEROL SULFATE 90 UG/1
1 AEROSOL, METERED RESPIRATORY (INHALATION) AS NEEDED
Refills: 2 | COMMUNITY
Start: 2019-07-16

## 2019-08-02 RX ORDER — LISINOPRIL 10 MG/1
10 TABLET ORAL DAILY
Refills: 2 | COMMUNITY
Start: 2019-07-08

## 2019-08-02 RX ORDER — LEVOTHYROXINE SODIUM 0.12 MG/1
125 TABLET ORAL DAILY
Refills: 2 | COMMUNITY
Start: 2019-07-08

## 2019-08-02 RX ORDER — LORATADINE 10 MG/1
10 TABLET ORAL 2 TIMES DAILY
Refills: 2 | COMMUNITY
Start: 2019-07-08

## 2019-08-02 NOTE — PROGRESS NOTES
Subjective   Corinne CEDENO is a 53 y.o. female here today for thyroid check referred by Yesi Briggs.    History of Present Illness  Ms. Cedeno was seen in the office today for thyroid evaluation.  This is a 53-year-old female who reports the sensation of difficulty swallowing at the level of the neck.  She does state that she had an upper endoscopy and is stretching of her esophagus and this helped for a while but the symptoms are now recurring.  She underwent a thyroid ultrasound on 6/24/2019.  Will need the report is available not the images.  The report describes a focal region of heterogeneous hyper echogenicity in the left thyroid lobe.  It reported that this could be a nodule but it was below the size threshold for fine-needle aspiration biopsy one year follow-up was recommended.  Patient denies any palpable masses in the thyroid.  She has no personal history of thyroid cancer.  There is no family history of thyroid cancer.  The patient is on thyroid replacement of 125 mcg daily.  Allergies   Allergen Reactions   • Iodine Rash   • Penicillins Swelling and Rash     Current Outpatient Medications   Medication Sig Dispense Refill   • albuterol sulfate  (90 Base) MCG/ACT inhaler Inhale 1 puff As Needed. every 4 to 6 hours  2   • ASPIRIN LOW DOSE 81 MG EC tablet Take 81 mg by mouth Daily.  3   • Blood Glucose Monitoring Suppl kit 1 Device Daily. 1 each 0   • buPROPion XL (WELLBUTRIN XL) 150 MG 24 hr tablet Take 150 mg by mouth Daily.  0   • Cholecalciferol (VITAMIN D3) 95319 units capsule Take 1 capsule by mouth Every 7 (Seven) Days. 4 capsule 5   • cyclobenzaprine (FLEXERIL) 10 MG tablet Take 1 tablet by mouth 2 (Two) Times a Day As Needed for Muscle Spasms. 60 tablet 3   • DROPLET LANCETS ULTRA THIN 30G misc 1 applicator Daily. 30 each 5   • fluconazole (DIFLUCAN) 150 MG tablet Take 1 tablet by mouth Every Other Day. 2 tablet 0   • fluticasone (FLONASE) 50 MCG/ACT nasal spray 2 sprays into each nostril  Daily. Administer 2 sprays in each nostril for each dose. 1 bottle 5   • glucose blood test strip Use as instructed 30 each 12   • levothyroxine (SYNTHROID, LEVOTHROID) 112 MCG tablet Take 1 tablet by mouth Daily. 30 tablet 3   • levothyroxine (SYNTHROID, LEVOTHROID) 125 MCG tablet Take 125 mcg by mouth Daily.  2   • lisinopril (PRINIVIL,ZESTRIL) 10 MG tablet Take 10 mg by mouth Daily.  2   • loratadine (CLARITIN) 10 MG tablet Take 10 mg by mouth 2 (Two) Times a Day.  2   • meclizine (ANTIVERT) 25 MG tablet Take 1 tablet by mouth 3 (Three) Times a Day As Needed for dizziness. 90 tablet 0   • metoprolol succinate XL (TOPROL-XL) 25 MG 24 hr tablet Take 25 mg by mouth Daily.  3   • promethazine-dextromethorphan (PROMETHAZINE-DM) 6.25-15 MG/5ML syrup Take 5 mL by mouth At Night As Needed for Cough. 150 mL 0     No current facility-administered medications for this visit.      Past Medical History:   Diagnosis Date   • Allergic    • Anxiety    • Asthma    • Coronary artery disease    • Depression    • Diabetes mellitus (CMS/HCC)    • Disease of thyroid gland    • Hyperlipidemia    • Hypertension    • Hypothyroid    • Tuberculin skin test (TST) positive    • Urinary tract infection      Past Surgical History:   Procedure Laterality Date   •  SECTION     • HERNIA REPAIR     • HYSTERECTOMY     • SPLENECTOMY     • TONSILLECTOMY       Review of Systems  General: weight gain 15 lbs  Integumentary: negative  Eyes: glasses, red eyes, eyes itch, discharge from eyes  ENT: earache, recurrent sore throat and hoarseness  Respiratory: shortness of breath, chronic cough and wheezing  Gastrointestinal: nausea/vomiting, heartburn, diarrhea, constipation and abdominal pain  Cardiovascular: rapid heart rate and palpitations  Neurological: numbness, headaches and dizziness  Psychiatric: depression  Hematologic/Lymphatic: negative  Genitourinary: incontinence and frequent urination  Musculoskeletal: painful joints, sore muscles and  "joint swelling  Endocrine: hot flashes, history of thyroid problem, underactive thyroid and thyroid nodule  Breasts: negative        Objective   Ht 165.1 cm (65\")   Wt 124 kg (274 lb)   BMI 45.60 kg/m²   Physical Exam  On examination this is a morbidly obese female in no acute distress  HEENT examination: Within normal limits.  Conjunctiva pink.  Nose and ears appear normal.  Neck: Supple, full range of motion.  No JVD.  No palpable thyroid mass.  No cervical or supraclavicular adenopathy  Musculoskeletal: Full range of motion all extremities without focal weakness. Normal gait. No digital cyanosis.  Psych: Patient is alert, oriented x3. Mood and affect are appropriate.  Skin: No rash or lesion.  Results/Data  Ultrasound report was reviewed.  Limited ultrasound was done in the office with a definite nodule was not appreciated  Procedures       Assessment/Plan     Abnormal ultrasound of the thyroid.  The report is ambiguous and confusing.  Certainly the statement that a 2.1 cm thyroid nodule is below the size threshold for FNA is totally inaccurate    Plan: Repeat thyroid ultrasound.  If nodule is confirmed then it should be amenable to ultrasound-guided biopsy         Discussion/Summary    Patient's Body mass index is 45.6 kg/m². BMI is above normal parameters. Recommendations include: educational material.       Errors in dictation may reflect use of voice recognition software and not all errors in transcription may have been detected prior to signing.    No future appointments.  "

## 2019-08-03 PROBLEM — E07.9 DISEASE OF THYROID GLAND: Status: ACTIVE | Noted: 2019-08-03

## 2019-08-06 ENCOUNTER — HOSPITAL ENCOUNTER (OUTPATIENT)
Dept: ULTRASOUND IMAGING | Facility: HOSPITAL | Age: 54
Discharge: HOME | End: 2019-08-06
Admitting: SURGERY

## 2019-08-06 DIAGNOSIS — E04.1 THYROID NODULE: ICD-10-CM

## 2019-08-06 PROCEDURE — 76536 US EXAM OF HEAD AND NECK: CPT

## 2019-08-06 PROCEDURE — 76536 US EXAM OF HEAD AND NECK: CPT | Performed by: RADIOLOGY

## 2019-08-07 ENCOUNTER — TELEPHONE (OUTPATIENT)
Dept: SURGERY | Facility: CLINIC | Age: 54
End: 2019-08-07

## 2019-08-07 NOTE — TELEPHONE ENCOUNTER
Called Miss Marshy to let her know that the US did not show a mass. Told her to call if she needs us. BRYON

## 2021-03-18 ENCOUNTER — BULK ORDERING (OUTPATIENT)
Dept: CASE MANAGEMENT | Facility: OTHER | Age: 56
End: 2021-03-18

## 2021-03-18 DIAGNOSIS — Z23 IMMUNIZATION DUE: ICD-10-CM

## 2023-06-26 PROBLEM — R91.1 PULMONARY NODULE: Status: ACTIVE | Noted: 2023-06-26

## 2023-06-26 PROBLEM — R06.09 DOE (DYSPNEA ON EXERTION): Status: ACTIVE | Noted: 2023-06-26

## 2023-06-26 PROBLEM — J44.9 COPD MIXED TYPE: Status: ACTIVE | Noted: 2023-06-26

## 2023-08-28 ENCOUNTER — OFFICE VISIT (OUTPATIENT)
Dept: PULMONOLOGY | Facility: CLINIC | Age: 58
End: 2023-08-28
Payer: COMMERCIAL

## 2023-08-28 VITALS
BODY MASS INDEX: 40.82 KG/M2 | HEIGHT: 66 IN | TEMPERATURE: 98.7 F | WEIGHT: 254 LBS | OXYGEN SATURATION: 94 % | HEART RATE: 75 BPM | SYSTOLIC BLOOD PRESSURE: 102 MMHG | DIASTOLIC BLOOD PRESSURE: 72 MMHG

## 2023-08-28 DIAGNOSIS — Z72.0 TOBACCO ABUSE: ICD-10-CM

## 2023-08-28 DIAGNOSIS — J44.9 COPD MIXED TYPE: Primary | ICD-10-CM

## 2023-08-28 DIAGNOSIS — R06.09 DOE (DYSPNEA ON EXERTION): ICD-10-CM

## 2023-08-28 DIAGNOSIS — R91.1 PULMONARY NODULE: ICD-10-CM

## 2023-08-28 RX ORDER — LOSARTAN POTASSIUM AND HYDROCHLOROTHIAZIDE 12.5; 5 MG/1; MG/1
1 TABLET ORAL EVERY MORNING
COMMUNITY

## 2023-08-28 RX ORDER — FENOFIBRATE 145 MG/1
TABLET, COATED ORAL
COMMUNITY

## 2023-08-28 RX ORDER — ALLOPURINOL 100 MG/1
TABLET ORAL
COMMUNITY

## 2023-08-28 RX ORDER — HYDROXYZINE HYDROCHLORIDE 10 MG/1
TABLET, FILM COATED ORAL
COMMUNITY

## 2023-08-28 RX ORDER — ATORVASTATIN CALCIUM 10 MG/1
10 TABLET, FILM COATED ORAL DAILY
COMMUNITY

## 2023-08-28 RX ORDER — TOPIRAMATE 25 MG/1
TABLET ORAL
COMMUNITY

## 2023-08-28 RX ORDER — EVOLOCUMAB 140 MG/ML
INJECTION, SOLUTION SUBCUTANEOUS
COMMUNITY

## 2023-08-28 RX ORDER — TIOTROPIUM BROMIDE AND OLODATEROL 3.124; 2.736 UG/1; UG/1
2 SPRAY, METERED RESPIRATORY (INHALATION)
Qty: 1 EACH | Refills: 11 | Status: SHIPPED | OUTPATIENT
Start: 2023-08-28

## 2023-08-28 RX ORDER — FLUOXETINE HYDROCHLORIDE 20 MG/1
CAPSULE ORAL
COMMUNITY

## 2023-08-28 RX ORDER — SEMAGLUTIDE 0.68 MG/ML
INJECTION, SOLUTION SUBCUTANEOUS
COMMUNITY
Start: 2023-08-09

## 2023-08-28 RX ORDER — OXCARBAZEPINE 150 MG/1
TABLET, FILM COATED ORAL
COMMUNITY

## 2023-08-28 RX ORDER — EMPAGLIFLOZIN 25 MG/1
25 TABLET, FILM COATED ORAL DAILY
COMMUNITY

## 2023-08-28 NOTE — PROGRESS NOTES
PULMONARY  NOTE    Chief Complaint     Tobacco abuse, pulmonary nodule, COPD, dyspnea on exertion    History of Present Illness     58-year-old female returns today for follow-up  I initially saw her 6/26/2023    She has ongoing tobacco abuse and at this point does not have plans for smoking cessation    She had a 2 mm left upper lobe pulmonary nodule on a low-dose CT scan of the chest in April 2023  Our plans, per guidelines, were to follow-up with a repeat CT scan of the chest next year    She has dyspnea on exertion  She has been using albuterol as needed  She underwent PFTs with results as noted below    Patient Active Problem List   Diagnosis    Vitamin D deficiency disease    Vitamin B 12 deficiency    Essential hypertension    Type 2 diabetes mellitus with circulatory disorder    Primary osteoarthritis involving multiple joints    Mixed hyperlipidemia    Acquired hypothyroidism    Acute URI    Boil of buttock    Tobacco abuse    Cigarette smoker two packs a day or less    Primary osteoarthritis of left knee    Lesion of finger    Disease of thyroid gland    Mild-mod COPD    Pulmonary nodule (2mm PETRONA)    GUZMÁN (dyspnea on exertion)     Allergies   Allergen Reactions    Iodine Rash    Penicillins Swelling and Rash       Current Outpatient Medications:     allopurinol (ZYLOPRIM) 100 MG tablet, , Disp: , Rfl:     ASPIRIN LOW DOSE 81 MG EC tablet, Take 1 tablet by mouth Daily., Disp: , Rfl: 3    atorvastatin (LIPITOR) 10 MG tablet, Take 1 tablet by mouth Daily., Disp: , Rfl:     Blood Glucose Monitoring Suppl kit, 1 Device Daily., Disp: 1 each, Rfl: 0    DROPLET LANCETS ULTRA THIN 30G misc, 1 applicator Daily., Disp: 30 each, Rfl: 5    fenofibrate (TRICOR) 145 MG tablet, , Disp: , Rfl:     FLUoxetine (PROzac) 20 MG capsule, , Disp: , Rfl:     glucose blood test strip, Use as instructed, Disp: 30 each, Rfl: 12    hydrOXYzine (ATARAX) 10 MG tablet, , Disp: , Rfl:     Jardiance 25 MG tablet tablet, Take 1 tablet by  mouth Daily., Disp: , Rfl:     levothyroxine (SYNTHROID, LEVOTHROID) 125 MCG tablet, Take 1 tablet by mouth Daily., Disp: , Rfl: 2    losartan-hydrochlorothiazide (HYZAAR) 50-12.5 MG per tablet, Take 1 tablet by mouth Every Morning., Disp: , Rfl:     metoprolol succinate XL (TOPROL-XL) 25 MG 24 hr tablet, Take 1 tablet by mouth Daily., Disp: , Rfl: 3    OXcarbazepine (TRILEPTAL) 150 MG tablet, , Disp: , Rfl:     Ozempic, 0.25 or 0.5 MG/DOSE, 2 MG/3ML solution pen-injector, inject 0.5mg SUBCUTANEOUSLY ONCE WEEKLY AS directed, Disp: , Rfl:     Repatha SureClick solution auto-injector SureClick injection, inject 1ml (140mg) SUBCUTANEOUSLY EVERY 14 DAYS AS DIRECTED, Disp: , Rfl:     topiramate (TOPAMAX) 25 MG tablet, TAKE 1 Tablet BY MOUTH EVERY DAY FOR 1 WEEK, THEN INCREASE TO 1 TABLET BY MOUTH TWICE DAILY thereafter, Disp: , Rfl:     tiotropium bromide-olodaterol (Stiolto Respimat) 2.5-2.5 MCG/ACT aerosol solution inhaler, Inhale 2 puffs Daily., Disp: 1 each, Rfl: 11  MEDICATION LIST AND ALLERGIES REVIEWED.    Family History   Problem Relation Age of Onset    Diabetes Mother     Hypertension Mother     Heart disease Mother     Cancer Mother         breast    Diabetes Father     Hypertension Father     Cancer Father         Stomach and skin cancer    Heart disease Father     Hypertension Maternal Grandmother     Cancer Maternal Grandmother         Brain    Hypertension Maternal Grandfather     Cancer Maternal Grandfather         Throat    Heart disease Paternal Grandmother     Hypertension Paternal Grandmother         Breast and skin    Cancer Paternal Grandmother         Breast, skin    Heart disease Paternal Grandfather     Hypertension Paternal Grandfather      Social History     Tobacco Use    Smoking status: Every Day     Packs/day: 2.00     Years: 34.00     Pack years: 68.00     Types: Cigarettes     Passive exposure: Current    Smokeless tobacco: Never    Tobacco comments:     Encouraged smoking cessation.  "  Vaping Use    Vaping Use: Never used   Substance Use Topics    Alcohol use: No    Drug use: No     Social History     Social History Narrative        Works within the home    Smokes up to 2 packs of cigarettes per day with 70-pack-year smoking history     FAMILY AND SOCIAL HISTORY REVIEWED.    Review of Systems  IF PRESENT REFER TO SCANNED ROS SHEET FROM SAME DATE  OTHERWISE ROS OBTAINED AND NON-CONTRIBUTORY OVER HPI.    /72   Pulse 75   Temp 98.7 øF (37.1 øC)   Ht 167.6 cm (66\")   Wt 115 kg (254 lb)   SpO2 94%   BMI 41.00 kg/mý   Physical Exam  Vitals and nursing note reviewed.   Constitutional:       General: She is not in acute distress.     Appearance: She is well-developed. She is not diaphoretic.   HENT:      Head: Normocephalic and atraumatic.   Neck:      Thyroid: No thyromegaly.   Cardiovascular:      Rate and Rhythm: Normal rate and regular rhythm.      Heart sounds: Normal heart sounds. No murmur heard.  Pulmonary:      Effort: Pulmonary effort is normal.      Breath sounds: Normal breath sounds. No stridor.   Lymphadenopathy:      Cervical: No cervical adenopathy.      Upper Body:      Right upper body: No supraclavicular or epitrochlear adenopathy.      Left upper body: No supraclavicular or epitrochlear adenopathy.   Skin:     General: Skin is warm and dry.   Neurological:      Mental Status: She is alert and oriented to person, place, and time.   Psychiatric:         Behavior: Behavior normal.       Results     PFTs were reviewed  Submaximal expiratory effort  Cannot exclude mild to moderate obstructive airways disease  No restriction normal diffusion capacity    Immunization History   Administered Date(s) Administered    COVID-19 (MODERNA) 1st,2nd,3rd Dose Monovalent 03/31/2021, 04/28/2021    Flu Vaccine Quad PF >36MO 11/07/2017    Tdap 08/28/2014     Problem List       ICD-10-CM ICD-9-CM   1. Mild-mod COPD  J44.9 496   2. GUZMÁN (dyspnea on exertion)  R06.09 786.09   3. Tobacco " abuse  Z72.0 305.1   4. Pulmonary nodule (2mm PETRONA)  R91.1 793.11       Discussion     We discussed her PFTs  I think her PFTs are probably consistent with obstructive airways disease  Difficult to really document with a submaximal expiratory effort    We discussed maintenance bronchodilator therapy  I have recommended a LAMA/LABA combination  Looks like Marysol is covered by her insurance  Gave her samples, written instruction, and a demonstration of use    We discussed the need for total smoking abstinence    At the very least I recommended a repeat LDCT in April 2024    Moderate level of Medical Decision Making complexity based on 2 or more chronic stable illnesses and an independent review of test results and/or prescription drug management.    Dayron Holland MD  Note electronically signed    CC: Mimi Ohara APRN

## 2024-05-21 ENCOUNTER — HOSPITAL ENCOUNTER (OUTPATIENT)
Dept: CT IMAGING | Facility: HOSPITAL | Age: 59
Discharge: HOME | End: 2024-05-21
Admitting: INTERNAL MEDICINE
Payer: COMMERCIAL

## 2024-05-21 DIAGNOSIS — R06.09 DOE (DYSPNEA ON EXERTION): ICD-10-CM

## 2024-05-21 DIAGNOSIS — Z72.0 TOBACCO ABUSE: ICD-10-CM

## 2024-05-21 DIAGNOSIS — J44.9 COPD MIXED TYPE: ICD-10-CM

## 2024-05-21 DIAGNOSIS — R91.1 PULMONARY NODULE: ICD-10-CM

## 2024-05-21 PROCEDURE — 71271 CT THORAX LUNG CANCER SCR C-: CPT

## 2024-05-29 ENCOUNTER — DOCUMENTATION (OUTPATIENT)
Dept: PULMONOLOGY | Facility: CLINIC | Age: 59
End: 2024-05-29
Payer: COMMERCIAL

## 2024-05-29 NOTE — PROGRESS NOTES
The patient underwent a LDCT which revealed no suspicious lesions    I discussed these results with the patient on the phone and encouraged her to keep her June follow-up appointment

## 2024-06-17 ENCOUNTER — OFFICE VISIT (OUTPATIENT)
Dept: PULMONOLOGY | Facility: CLINIC | Age: 59
End: 2024-06-17
Payer: COMMERCIAL

## 2024-06-17 VITALS
DIASTOLIC BLOOD PRESSURE: 60 MMHG | HEIGHT: 65 IN | HEART RATE: 72 BPM | TEMPERATURE: 97.9 F | BODY MASS INDEX: 38.12 KG/M2 | SYSTOLIC BLOOD PRESSURE: 98 MMHG | OXYGEN SATURATION: 93 % | WEIGHT: 228.8 LBS

## 2024-06-17 DIAGNOSIS — J44.9 CHRONIC OBSTRUCTIVE PULMONARY DISEASE, UNSPECIFIED COPD TYPE: Primary | ICD-10-CM

## 2024-06-17 DIAGNOSIS — Z12.2 ENCOUNTER FOR SCREENING FOR LUNG CANCER: ICD-10-CM

## 2024-06-17 DIAGNOSIS — E66.01 CLASS 2 SEVERE OBESITY DUE TO EXCESS CALORIES WITH SERIOUS COMORBIDITY AND BODY MASS INDEX (BMI) OF 38.0 TO 38.9 IN ADULT: ICD-10-CM

## 2024-06-17 DIAGNOSIS — F17.210 CIGARETTE NICOTINE DEPENDENCE WITHOUT COMPLICATION: ICD-10-CM

## 2024-06-17 PROCEDURE — 99214 OFFICE O/P EST MOD 30 MIN: CPT | Performed by: NURSE PRACTITIONER

## 2024-06-17 PROCEDURE — 1159F MED LIST DOCD IN RCRD: CPT | Performed by: NURSE PRACTITIONER

## 2024-06-17 PROCEDURE — 1160F RVW MEDS BY RX/DR IN RCRD: CPT | Performed by: NURSE PRACTITIONER

## 2024-06-17 PROCEDURE — 3078F DIAST BP <80 MM HG: CPT | Performed by: NURSE PRACTITIONER

## 2024-06-17 PROCEDURE — 3074F SYST BP LT 130 MM HG: CPT | Performed by: NURSE PRACTITIONER

## 2024-06-17 RX ORDER — ERGOCALCIFEROL 1.25 MG/1
1 CAPSULE ORAL WEEKLY
COMMUNITY
Start: 2024-06-11

## 2024-06-17 RX ORDER — SEMAGLUTIDE 2.68 MG/ML
INJECTION, SOLUTION SUBCUTANEOUS
COMMUNITY
Start: 2024-06-11

## 2024-06-17 RX ORDER — ALBUTEROL SULFATE 90 UG/1
2 AEROSOL, METERED RESPIRATORY (INHALATION) EVERY 4 HOURS PRN
COMMUNITY

## 2024-06-17 RX ORDER — FLUOXETINE HYDROCHLORIDE 40 MG/1
40 CAPSULE ORAL DAILY
COMMUNITY
Start: 2024-05-10

## 2024-06-17 RX ORDER — HYDROXYZINE HYDROCHLORIDE 25 MG/1
25 TABLET, FILM COATED ORAL
COMMUNITY
Start: 2024-05-10

## 2024-06-17 NOTE — PROGRESS NOTES
"Chief Complaint  Mild-mod COPD (New CT on 5/21/24)    Subjective        Corinne CEDENO presents to [facility]  History of Present Illness    Ms. Cedeno is a 58 year old female with a medical history significant for hypertension, hyperlipidemia, diabetes, and COPD.    She presents today for follow up on COPD.  She sates that she has been doing well since her last visit.  She reports that her breahting is about the same.  She is currently taking Stiolto once daily and albuterol as needed.  She is a current smoker.        Objective   Vital Signs:  BP 98/60   Pulse 72   Temp 97.9 °F (36.6 °C)   Ht 165.1 cm (65\")   Wt 104 kg (228 lb 12.8 oz)   SpO2 93%   BMI 38.07 kg/m²   Estimated body mass index is 38.07 kg/m² as calculated from the following:    Height as of this encounter: 165.1 cm (65\").    Weight as of this encounter: 104 kg (228 lb 12.8 oz).               Physical Exam     GENERAL APPEARANCE: Well developed, well nourished, alert and cooperative, and appears to be in no acute distress.    HEAD: normocephalic. Atraumatic.    EYES: PERRL, EOMI. Vision is grossly intact.    THROAT: Oral cavity and pharynx normal. No inflammation, swelling, exudate, or lesions.     NECK: Neck supple.  No thyromegaly.    CARDIAC: Normal S1 and S2. No S3, S4 or murmurs. Rhythm is regular.     RESPIRATORY:Bilateral air entry positive. Bilateral diminished breath sounds. No wheezing, crackles or rhonchi noted.    GI: Positive bowel sounds. Soft, nondistended, nontender.     MUSCULOSKELETAL: No significant deformity or joint abnormality. No edema. Peripheral pulses intact. No varicosities.    NEUROLOGICAL: Strength and sensation symmetric and intact throughout.     PSYCHIATRIC: The mental examination revealed the patient was oriented to person, place, and time.     Result Review :    The following data was reviewed by: RAFAEL Betancourt on 06/17/2024:               "   Assessment and Plan     Diagnoses and all orders for this visit:    1. Chronic obstructive pulmonary disease, unspecified COPD type (Primary)    2. Cigarette nicotine dependence without complication  -     CT Chest Low Dose Wo; Future    3. Encounter for screening for lung cancer  -     CT Chest Low Dose Wo; Future    4. Class 2 severe obesity due to excess calories with serious comorbidity and body mass index (BMI) of 38.0 to 38.9 in adult        PFT shows airway obstruction.    Continue Stiolto once daily.  Continue albuterol as needed.        Corinne CEDENO  reports that she has been smoking cigarettes. She has a 68 pack-year smoking history. She has been exposed to tobacco smoke. She has never used smokeless tobacco. I have educated her on the risk of diseases from using tobacco products such as cancer, COPD, and heart disease.     I advised her to quit and she is not willing to quit.    LDCT was reviewed.  Lung Rads Category 1.  We will plan to repeat in one year per the screening guidelines.       Discussed diet and exercise.    Follow Up     Return in about 1 year (around 6/17/2025).  Patient was given instructions and counseling regarding her condition or for health maintenance advice. Please see specific information pulled into the AVS if appropriate.

## 2025-05-28 ENCOUNTER — HOSPITAL ENCOUNTER (OUTPATIENT)
Facility: HOSPITAL | Age: 60
Discharge: HOME | End: 2025-05-28
Admitting: NURSE PRACTITIONER
Payer: COMMERCIAL

## 2025-05-28 DIAGNOSIS — F17.210 CIGARETTE NICOTINE DEPENDENCE WITHOUT COMPLICATION: ICD-10-CM

## 2025-05-28 DIAGNOSIS — Z12.2 ENCOUNTER FOR SCREENING FOR LUNG CANCER: ICD-10-CM

## 2025-05-28 PROCEDURE — 71271 CT THORAX LUNG CANCER SCR C-: CPT | Performed by: RADIOLOGY

## 2025-05-28 PROCEDURE — 71271 CT THORAX LUNG CANCER SCR C-: CPT

## 2025-05-29 ENCOUNTER — DOCUMENTATION (OUTPATIENT)
Dept: PULMONOLOGY | Facility: CLINIC | Age: 60
End: 2025-05-29
Payer: COMMERCIAL

## 2025-05-29 NOTE — LETTER
Corinne Camacho  [address]  [place] [address]    May 29, 2025     Dear Ms. Camacho:    Below are the results from your recent visit:    Low dose CT Chest for lung cancer screening was reviewed.  No pulmonary nodules were noted.  We will plan to repeat CT chest in one year per the screening guidelines.    If you have any questions or concerns, please don't hesitate to call.         Sincerely,        Thu Miramontes APRN

## 2025-05-30 NOTE — PROGRESS NOTES
Low dose CT Chest was reviewed.  Lung Rads Category 1.  We will plan to repeat CT Chest in one year.